# Patient Record
Sex: FEMALE | Race: WHITE | NOT HISPANIC OR LATINO | Employment: OTHER | URBAN - METROPOLITAN AREA
[De-identification: names, ages, dates, MRNs, and addresses within clinical notes are randomized per-mention and may not be internally consistent; named-entity substitution may affect disease eponyms.]

---

## 2017-01-10 ENCOUNTER — OFFICE VISIT - RIVER FALLS (OUTPATIENT)
Dept: FAMILY MEDICINE | Facility: CLINIC | Age: 57
End: 2017-01-10

## 2017-03-27 ENCOUNTER — OFFICE VISIT - RIVER FALLS (OUTPATIENT)
Dept: FAMILY MEDICINE | Facility: CLINIC | Age: 57
End: 2017-03-27

## 2017-03-27 ASSESSMENT — MIFFLIN-ST. JEOR: SCORE: 1122

## 2017-04-28 ENCOUNTER — OFFICE VISIT - RIVER FALLS (OUTPATIENT)
Dept: FAMILY MEDICINE | Facility: CLINIC | Age: 57
End: 2017-04-28

## 2017-04-28 ASSESSMENT — MIFFLIN-ST. JEOR: SCORE: 1126.53

## 2017-07-21 ENCOUNTER — OFFICE VISIT - RIVER FALLS (OUTPATIENT)
Dept: FAMILY MEDICINE | Facility: CLINIC | Age: 57
End: 2017-07-21

## 2017-07-21 ASSESSMENT — MIFFLIN-ST. JEOR: SCORE: 1104.76

## 2017-07-25 LAB
CHOLEST SERPL-MCNC: 215 MG/DL (ref 125–200)
CHOLEST/HDLC SERPL: 3.5 {RATIO}
HBA1C MFR BLD: 5.3 %
HDLC SERPL-MCNC: 61 MG/DL
LDLC SERPL CALC-MCNC: 119 MG/DL
NONHDLC SERPL-MCNC: 154 MG/DL
TRIGL SERPL-MCNC: 176 MG/DL

## 2018-01-10 ENCOUNTER — AMBULATORY - RIVER FALLS (OUTPATIENT)
Dept: FAMILY MEDICINE | Facility: CLINIC | Age: 58
End: 2018-01-10

## 2018-01-10 ENCOUNTER — OFFICE VISIT - RIVER FALLS (OUTPATIENT)
Dept: FAMILY MEDICINE | Facility: CLINIC | Age: 58
End: 2018-01-10

## 2018-01-15 ENCOUNTER — OFFICE VISIT - RIVER FALLS (OUTPATIENT)
Dept: FAMILY MEDICINE | Facility: CLINIC | Age: 58
End: 2018-01-15

## 2018-01-15 ASSESSMENT — MIFFLIN-ST. JEOR: SCORE: 1104.76

## 2018-02-14 ENCOUNTER — OFFICE VISIT - RIVER FALLS (OUTPATIENT)
Dept: FAMILY MEDICINE | Facility: CLINIC | Age: 58
End: 2018-02-14

## 2018-03-07 ENCOUNTER — AMBULATORY - RIVER FALLS (OUTPATIENT)
Dept: FAMILY MEDICINE | Facility: CLINIC | Age: 58
End: 2018-03-07

## 2018-03-14 ENCOUNTER — OFFICE VISIT - RIVER FALLS (OUTPATIENT)
Dept: FAMILY MEDICINE | Facility: CLINIC | Age: 58
End: 2018-03-14

## 2018-04-17 ENCOUNTER — AMBULATORY - RIVER FALLS (OUTPATIENT)
Dept: FAMILY MEDICINE | Facility: CLINIC | Age: 58
End: 2018-04-17

## 2018-04-25 ENCOUNTER — OFFICE VISIT - RIVER FALLS (OUTPATIENT)
Dept: FAMILY MEDICINE | Facility: CLINIC | Age: 58
End: 2018-04-25

## 2018-05-30 ENCOUNTER — OFFICE VISIT - RIVER FALLS (OUTPATIENT)
Dept: FAMILY MEDICINE | Facility: CLINIC | Age: 58
End: 2018-05-30

## 2018-05-30 ASSESSMENT — MIFFLIN-ST. JEOR: SCORE: 1087.52

## 2018-06-25 ENCOUNTER — TRANSFERRED RECORDS (OUTPATIENT)
Dept: HEALTH INFORMATION MANAGEMENT | Facility: CLINIC | Age: 58
End: 2018-06-25

## 2018-07-25 ENCOUNTER — OFFICE VISIT - RIVER FALLS (OUTPATIENT)
Dept: FAMILY MEDICINE | Facility: CLINIC | Age: 58
End: 2018-07-25

## 2018-07-25 ASSESSMENT — MIFFLIN-ST. JEOR: SCORE: 1097.5

## 2018-07-26 LAB
CHOLEST SERPL-MCNC: 231 MG/DL
CHOLEST/HDLC SERPL: 3.9 {RATIO}
GLUCOSE BLD-MCNC: 94 MG/DL (ref 65–99)
HDLC SERPL-MCNC: 60 MG/DL
LDLC SERPL CALC-MCNC: 141 MG/DL
NONHDLC SERPL-MCNC: 171 MG/DL
TRIGL SERPL-MCNC: 160 MG/DL

## 2018-08-10 ENCOUNTER — OFFICE VISIT - RIVER FALLS (OUTPATIENT)
Dept: FAMILY MEDICINE | Facility: CLINIC | Age: 58
End: 2018-08-10

## 2019-01-30 ENCOUNTER — OFFICE VISIT - RIVER FALLS (OUTPATIENT)
Dept: FAMILY MEDICINE | Facility: CLINIC | Age: 59
End: 2019-01-30

## 2019-07-30 ENCOUNTER — OFFICE VISIT - RIVER FALLS (OUTPATIENT)
Dept: FAMILY MEDICINE | Facility: CLINIC | Age: 59
End: 2019-07-30

## 2019-07-30 ASSESSMENT — MIFFLIN-ST. JEOR: SCORE: 1103.85

## 2019-07-31 LAB
B BURGDOR IGG+IGM SER QL: <0.9
CHOLEST SERPL-MCNC: 225 MG/DL
CHOLEST/HDLC SERPL: 3.8 {RATIO}
ERYTHROCYTE [SEDIMENTATION RATE] IN BLOOD BY WESTERGREN METHOD: 9 MM/H
HBA1C MFR BLD: 5.6 %
HDLC SERPL-MCNC: 60 MG/DL
LDLC SERPL CALC-MCNC: 134 MG/DL
NONHDLC SERPL-MCNC: 165 MG/DL
TRIGL SERPL-MCNC: 177 MG/DL
TSH SERPL DL<=0.005 MIU/L-ACNC: 3.47 MIU/L (ref 0.4–4.5)

## 2019-08-01 ENCOUNTER — COMMUNICATION - RIVER FALLS (OUTPATIENT)
Dept: FAMILY MEDICINE | Facility: CLINIC | Age: 59
End: 2019-08-01

## 2019-08-02 ENCOUNTER — COMMUNICATION - RIVER FALLS (OUTPATIENT)
Dept: FAMILY MEDICINE | Facility: CLINIC | Age: 59
End: 2019-08-02

## 2019-08-05 ENCOUNTER — COMMUNICATION - RIVER FALLS (OUTPATIENT)
Dept: FAMILY MEDICINE | Facility: CLINIC | Age: 59
End: 2019-08-05

## 2019-08-07 ENCOUNTER — COMMUNICATION - RIVER FALLS (OUTPATIENT)
Dept: FAMILY MEDICINE | Facility: CLINIC | Age: 59
End: 2019-08-07

## 2019-08-21 ENCOUNTER — OFFICE VISIT - RIVER FALLS (OUTPATIENT)
Dept: FAMILY MEDICINE | Facility: CLINIC | Age: 59
End: 2019-08-21

## 2020-05-14 ENCOUNTER — COMMUNICATION - RIVER FALLS (OUTPATIENT)
Dept: FAMILY MEDICINE | Facility: CLINIC | Age: 60
End: 2020-05-14

## 2020-07-24 ENCOUNTER — AMBULATORY - RIVER FALLS (OUTPATIENT)
Dept: FAMILY MEDICINE | Facility: CLINIC | Age: 60
End: 2020-07-24

## 2020-07-25 ENCOUNTER — COMMUNICATION - RIVER FALLS (OUTPATIENT)
Dept: FAMILY MEDICINE | Facility: CLINIC | Age: 60
End: 2020-07-25

## 2020-07-25 LAB
CHOLEST SERPL-MCNC: 213 MG/DL
CHOLEST/HDLC SERPL: 3.9 {RATIO}
HDLC SERPL-MCNC: 55 MG/DL
LDLC SERPL CALC-MCNC: 130 MG/DL
NONHDLC SERPL-MCNC: 158 MG/DL
TRIGL SERPL-MCNC: 168 MG/DL
TSH SERPL DL<=0.005 MIU/L-ACNC: 3.85 MIU/L (ref 0.4–4.5)

## 2020-09-08 ENCOUNTER — OFFICE VISIT - RIVER FALLS (OUTPATIENT)
Dept: FAMILY MEDICINE | Facility: CLINIC | Age: 60
End: 2020-09-08

## 2020-11-19 ENCOUNTER — COMMUNICATION - RIVER FALLS (OUTPATIENT)
Dept: FAMILY MEDICINE | Facility: CLINIC | Age: 60
End: 2020-11-19

## 2020-11-19 ENCOUNTER — AMBULATORY - RIVER FALLS (OUTPATIENT)
Dept: FAMILY MEDICINE | Facility: CLINIC | Age: 60
End: 2020-11-19

## 2020-11-19 ENCOUNTER — OFFICE VISIT - RIVER FALLS (OUTPATIENT)
Dept: FAMILY MEDICINE | Facility: CLINIC | Age: 60
End: 2020-11-19

## 2021-03-24 ENCOUNTER — AMBULATORY - RIVER FALLS (OUTPATIENT)
Dept: FAMILY MEDICINE | Facility: CLINIC | Age: 61
End: 2021-03-24

## 2021-04-21 ENCOUNTER — AMBULATORY - RIVER FALLS (OUTPATIENT)
Dept: FAMILY MEDICINE | Facility: CLINIC | Age: 61
End: 2021-04-21

## 2021-04-21 ENCOUNTER — COMMUNICATION - RIVER FALLS (OUTPATIENT)
Dept: FAMILY MEDICINE | Facility: CLINIC | Age: 61
End: 2021-04-21

## 2021-05-06 ENCOUNTER — TRANSFERRED RECORDS (OUTPATIENT)
Dept: HEALTH INFORMATION MANAGEMENT | Facility: CLINIC | Age: 61
End: 2021-05-06

## 2021-07-14 ENCOUNTER — COMMUNICATION - RIVER FALLS (OUTPATIENT)
Dept: FAMILY MEDICINE | Facility: CLINIC | Age: 61
End: 2021-07-14

## 2021-07-14 ENCOUNTER — OFFICE VISIT - RIVER FALLS (OUTPATIENT)
Dept: FAMILY MEDICINE | Facility: CLINIC | Age: 61
End: 2021-07-14

## 2021-07-14 ASSESSMENT — MIFFLIN-ST. JEOR: SCORE: 1114.4

## 2021-07-15 ENCOUNTER — COMMUNICATION - RIVER FALLS (OUTPATIENT)
Dept: FAMILY MEDICINE | Facility: CLINIC | Age: 61
End: 2021-07-15

## 2021-07-15 LAB
CHOLEST SERPL-MCNC: 251 MG/DL
CHOLEST/HDLC SERPL: 4.3 {RATIO}
GLUCOSE BLD-MCNC: 95 MG/DL (ref 65–99)
HDLC SERPL-MCNC: 59 MG/DL
LDLC SERPL CALC-MCNC: 157 MG/DL
NONHDLC SERPL-MCNC: 192 MG/DL
TRIGL SERPL-MCNC: 193 MG/DL
TSH SERPL DL<=0.005 MIU/L-ACNC: 3.45 MIU/L (ref 0.4–4.5)

## 2021-07-16 ENCOUNTER — TRANSFERRED RECORDS (OUTPATIENT)
Dept: HEALTH INFORMATION MANAGEMENT | Facility: CLINIC | Age: 61
End: 2021-07-16

## 2021-07-16 LAB — HPV ABSTRACT: NORMAL

## 2021-08-03 ENCOUNTER — COMMUNICATION - RIVER FALLS (OUTPATIENT)
Dept: FAMILY MEDICINE | Facility: CLINIC | Age: 61
End: 2021-08-03

## 2021-08-04 ENCOUNTER — COMMUNICATION - RIVER FALLS (OUTPATIENT)
Dept: FAMILY MEDICINE | Facility: CLINIC | Age: 61
End: 2021-08-04

## 2021-08-05 ENCOUNTER — COMMUNICATION - RIVER FALLS (OUTPATIENT)
Dept: FAMILY MEDICINE | Facility: CLINIC | Age: 61
End: 2021-08-05

## 2021-11-22 ENCOUNTER — AMBULATORY - RIVER FALLS (OUTPATIENT)
Dept: FAMILY MEDICINE | Facility: CLINIC | Age: 61
End: 2021-11-22

## 2022-01-11 ENCOUNTER — COMMUNICATION - RIVER FALLS (OUTPATIENT)
Dept: FAMILY MEDICINE | Facility: CLINIC | Age: 62
End: 2022-01-11

## 2022-01-31 ENCOUNTER — TRANSFERRED RECORDS (OUTPATIENT)
Dept: HEALTH INFORMATION MANAGEMENT | Facility: CLINIC | Age: 62
End: 2022-01-31

## 2022-02-11 VITALS
WEIGHT: 139.8 LBS | DIASTOLIC BLOOD PRESSURE: 62 MMHG | HEIGHT: 59 IN | HEART RATE: 64 BPM | BODY MASS INDEX: 28.18 KG/M2 | SYSTOLIC BLOOD PRESSURE: 110 MMHG

## 2022-02-11 VITALS
SYSTOLIC BLOOD PRESSURE: 112 MMHG | WEIGHT: 139.8 LBS | HEIGHT: 59 IN | TEMPERATURE: 97.3 F | HEART RATE: 60 BPM | BODY MASS INDEX: 28.18 KG/M2 | DIASTOLIC BLOOD PRESSURE: 80 MMHG

## 2022-02-11 VITALS
WEIGHT: 143.6 LBS | HEART RATE: 62 BPM | TEMPERATURE: 98.1 F | HEIGHT: 59 IN | TEMPERATURE: 98.1 F | BODY MASS INDEX: 29.15 KG/M2 | SYSTOLIC BLOOD PRESSURE: 119 MMHG | HEIGHT: 59 IN | BODY MASS INDEX: 28.95 KG/M2 | DIASTOLIC BLOOD PRESSURE: 70 MMHG | HEART RATE: 80 BPM | DIASTOLIC BLOOD PRESSURE: 81 MMHG | WEIGHT: 144.6 LBS | SYSTOLIC BLOOD PRESSURE: 126 MMHG

## 2022-02-11 VITALS
WEIGHT: 139.6 LBS | BODY MASS INDEX: 28.14 KG/M2 | HEART RATE: 64 BPM | SYSTOLIC BLOOD PRESSURE: 116 MMHG | HEIGHT: 59 IN | TEMPERATURE: 97.6 F | DIASTOLIC BLOOD PRESSURE: 78 MMHG

## 2022-02-11 VITALS
DIASTOLIC BLOOD PRESSURE: 76 MMHG | HEART RATE: 71 BPM | SYSTOLIC BLOOD PRESSURE: 112 MMHG | HEIGHT: 59 IN | WEIGHT: 136 LBS | TEMPERATURE: 98.2 F | BODY MASS INDEX: 27.42 KG/M2

## 2022-02-11 VITALS
HEART RATE: 68 BPM | DIASTOLIC BLOOD PRESSURE: 74 MMHG | HEIGHT: 59 IN | BODY MASS INDEX: 27.86 KG/M2 | TEMPERATURE: 98 F | SYSTOLIC BLOOD PRESSURE: 112 MMHG | WEIGHT: 138.2 LBS

## 2022-02-12 VITALS
DIASTOLIC BLOOD PRESSURE: 78 MMHG | OXYGEN SATURATION: 95 % | WEIGHT: 142.8 LBS | HEART RATE: 81 BPM | BODY MASS INDEX: 28.79 KG/M2 | TEMPERATURE: 97.6 F | SYSTOLIC BLOOD PRESSURE: 116 MMHG | HEIGHT: 59 IN

## 2022-02-16 NOTE — TELEPHONE ENCOUNTER
---------------------  From: Catherine Aguilar LPN   To: Referral Coordinators Pool ([id number]_[facility]);     Sent: 7/14/2021 9:14:54 AM CDT  Subject: Imaging Order       Orders have been placed for patient to have a DEXA scan and mammogram. She doesn't want to scheduled until 09/2021.   Normal rate, regular rhythm.  Heart sounds S1, S2.  No murmurs, rubs or gallops.

## 2022-02-16 NOTE — NURSING NOTE
Comprehensive Intake Entered On:  11/19/2020 12:31 PM CST    Performed On:  11/19/2020 12:23 PM CST by Catherine Aguilar LPN               Summary   Chief Complaint :   would like to be tested for COVID, lower back pain, joints hurt, some chest discomfort, two co-workers tested positive and three are pending results   Advance Directive :   No   Languages :   English   Catherine Aguilar LPN - 11/19/2020 12:23 PM CST   Health Status   Allergies Verified? :   Yes   Medication History Verified? :   Yes   Catherine Aguilar LPN - 11/19/2020 12:23 PM CST   Meds / Allergies   (As Of: 11/19/2020 12:31:12 PM CST)   Allergies (Active)   No Known Medication Allergies  Estimated Onset Date:   Unspecified ; Created By:   Penny Lutz CMA; Reaction Status:   Active ; Category:   Drug ; Substance:   No Known Medication Allergies ; Type:   Allergy ; Updated By:   Penny Lutz CMA; Reviewed Date:   7/30/2019 8:56 AM CDT        Medication List   (As Of: 11/19/2020 12:31:12 PM CST)   Prescription/Discharge Order    levothyroxine  :   levothyroxine ; Status:   Prescribed ; Ordered As Mnemonic:   Synthroid 75 mcg (0.075 mg) oral tablet ; Simple Display Line:   1 tab(s), Oral, qam, 90 tab(s), 3 Refill(s) ; Ordering Provider:   Emilie Lassiter; Catalog Code:   levothyroxine ; Order Dt/Tm:   7/25/2020 4:07:11 PM CDT          triamcinolone topical  :   triamcinolone topical ; Status:   Prescribed ; Ordered As Mnemonic:   triamcinolone 0.1% topical cream ; Simple Display Line:   1 gabino, TOP, TID, to left palm--do not fill till she calls, 30 g, 0 Refill(s) ; Ordering Provider:   Emilie Lassiter; Catalog Code:   triamcinolone topical ; Order Dt/Tm:   7/30/2019 9:06:51 AM CDT          amoxicillin  :   amoxicillin ; Status:   Prescribed ; Ordered As Mnemonic:   amoxicillin 500 mg oral tablet ; Simple Display Line:   See Instructions, 4 tabs one hour before dental work, 4 EA, 1 Refill(s) ; Ordering Provider:   Emilie Lassiter;  Catalog Code:   amoxicillin ; Order Dt/Tm:   5/14/2020 12:22:51 PM CDT          ALPRAZolam  :   ALPRAZolam ; Status:   Prescribed ; Ordered As Mnemonic:   Xanax 1 mg oral tablet ; Simple Display Line:   See Instructions, take 1 tab 60 min before dental work , must have  for dentist appointment, PRN: for anxiety, 2 EA, 0 Refill(s) ; Ordering Provider:   Emilie Lassiter; Catalog Code:   ALPRAZolam ; Order Dt/Tm:   5/14/2020 1:45:43 PM CDT            ID Risk Screen   Recent Travel History :   No recent travel   Family Member Travel History :   No recent travel   Other Exposure to Infectious Disease :   Community exposure to COVID-19 within the last 14 days   Catherine Aguilar LPN - 11/19/2020 12:23 PM CST   Social History   Social History   (As Of: 11/19/2020 12:31:12 PM CST)   Alcohol:        Wine (5 oz), 3-5 times per week, 1 drinks/episode average.  Ready to change: No.   (Last Updated: 7/31/2019 6:35:45 PM CDT by Vanessa Salinas)          Tobacco:  Denies Tobacco Use      Never (less than 100 in lifetime)   (Last Updated: 11/19/2020 12:29:23 PM CST by Catherine Aguilar LPN)          Electronic Cigarette/Vaping:  Denies Electronic Cigarette Use      Electronic Cigarette Use: Never.   (Last Updated: 11/19/2020 12:29:29 PM CST by Catherine Aguilar LPN)          Substance Abuse:        Never   (Last Updated: 7/24/2017 8:43:47 AM CDT by Cristiane Donohue)          Employment/School:        Employed, Work/School description: .  Highest education level: University degree(s).   (Last Updated: 7/31/2019 6:36:04 PM CDT by Vanessa Salinas)          Home/Environment:        Marital status: .  Spouse/Partner name: Humberto.  Lives with Spouse.  3 children.  Injuries/Abuse/Neglect in household: No.  Feels unsafe at home: No.  Family/Friends available for support: Yes.   (Last Updated: 7/31/2019 6:36:33 PM CDT by Vanessa Salinas)          Nutrition/Health:        Type of diet: Regular.  Wants to lose weight: Yes.   Sleeping concerns: No.  Feels highly stressed: No.   (Last Updated: 7/31/2019 6:36:49 PM CDT by Vanessa Salinas)          Exercise:        Exercise frequency: 5-6 times/week.  Exercise type: Bicycling, eliptical, rowing machine.   (Last Updated: 7/31/2019 6:37:09 PM CDT by Vanessa Salinas)          Sexual:        Sexually active: Yes.  Sexual orientation: Heterosexual.   (Last Updated: 5/9/2014 2:51:15 PM CDT by Cristiane Liu MA)

## 2022-02-16 NOTE — TELEPHONE ENCOUNTER
Entered by Flori Chacko CMA on April 21, 2021 8:58:04 AM CDT  ---------------------  From: Flori Chacko CMA   To: "[facility]    Sent: 4/21/2021 8:58:00 AM CDT  Subject: Medication Management     ** Not Approved: Patient has requested refill too soon, #90, 3 sent 7/25/20 **  levothyroxine (LEVOTHYROXINE SODIUM 75MCG TABLET)  TAKE ONE TABLET BY MOUTH EVERY MORNING AS DIRECTED    DELIVER ALL ON MON  Qty:  90 EA        Days Supply:  90        Refills:  3          Substitutions Allowed     Route To Pharmacy - "[facility]   Signed by Flori Chacko CMA            ** Patient matched by Flori Chacko CMA on 4/21/2021 8:54:12 AM CDT **      ------------------------------------------  From: [employer]  To: Emilie Lassiter  Sent: April 21, 2021 8:32:40 AM CDT  Subject: Medication Management  Due: April 7, 2021 1:46:04 PM CDT     ** On Hold Pending Signature **     Drug: levothyroxine (levothyroxine 75 mcg (0.075 mg) oral tablet), TAKE ONE TABLET BY MOUTH EVERY MORNING AS DIRECTED DELIVER ALL ON MON  Quantity: 90 EA  Days Supply: 90  Refills: 3  Substitutions Allowed  Notes from Pharmacy:     Dispensed Drug: levothyroxine (levothyroxine 75 mcg (0.075 mg) oral tablet), TAKE ONE TABLET BY MOUTH EVERY MORNING AS DIRECTED DELIVER ALL ON MON  Quantity: 90 EA  Days Supply: 90  Refills: 3  Substitutions Allowed  Notes from Pharmacy:  ------------------------------------------

## 2022-02-16 NOTE — TELEPHONE ENCOUNTER
---------------------  From: Winter Pino LPN (Phone Messages Pool ([id number]))   To: [facility] Message Pool ([facility]     Sent: 1/8/2021 1:01:44 PM CST  Subject: triamcinolone refill     Phone Message    PCP:   IVAN      Time of Call:  12:27pm       Person Calling:  pt  Phone number:  [phone]    Returned call at:    Note:   Pt LM stating she needs a refill of the 1% cream [facility] had prescribed her for her hand. Pt would like this sent to "[facility].    Pt has Rx on med list for triamcinolone 0.1% topical cream 1 gabino TOP BID to left palm #30gm- last prescribed 7/30/19    Pt says Rx has to be generic or insurance will not cover.    Last office visit and reason:  11/19/20 video visit---------------------  From: Catherine Aguilar LPN ([facility] Message Pool ([facility]   To: Emilie Lassiter;     Sent: 1/8/2021 1:19:38 PM CST  Subject: FW: triamcinolone refill---------------------  From: Emilie Lassiter   To: [facility] Message Pool ([facility]);     Sent: 1/8/2021 2:13:07 PM CST  Subject: RE: triamcinolone refill     rx sent  all steroid creams are expensive, this is [email or web address] A male answered at provided number, asked that he have her contact me at the clinic when she is available.Pt LM at 3:23pm. Pt says OK to LM at [phone].    Returned call and LM letting pt know generic Rx sent and that all steroid creams are expensive.

## 2022-02-16 NOTE — TELEPHONE ENCOUNTER
---------------------  From: Emilie Lassiter   To: DUSTIN EDDY    Sent: 10/16/2021 3:54:21 PM CDT  Subject: General Message     Hi Dustin Ayala,    Your bone density results indicate:  _   Normal bone density  X_   Osteopenia in hip/spine (mild bone thinning   not osteoporosis)  _   Osteoporosis in hip/spine  Our recommendation is:  _   Schedule an appointment with your health care provider to discuss your results.     X_   Continue current regimen    _   Calcium    Recommended daily amounts for men and women are:  o Men age 50 - 69  1000 mg  o Men age 70+  1200 mg  o Women age 50+  1200 mg  Vitamin D    800 - 1000 IU daily  Weight bearing Exercise    30 minutes or more several times a week  Avoid excess alcohol and smoking  Prevent falling    Avoid excessive sedation or hypotension (low blood pressure)    Improve strength    Decrease or remove environmental hazards    _   Repeat bone density in 2-3_ year(s)    Things are stable at this point, thank you.    DILCIA Blake

## 2022-02-16 NOTE — TELEPHONE ENCOUNTER
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  Both pt and her  would qualify for COVID vaccine.  Please call to schedulepatient is scheduled

## 2022-02-16 NOTE — NURSING NOTE
Faxed biometric screening form to [employer] at [phone]. Confirmation received. Original form mailed to patient's home address.

## 2022-02-16 NOTE — TELEPHONE ENCOUNTER
Entered by Cecilia Buck CMA on July 28, 2020 3:52:20 PM CDT  ---------------------  From: Cecilia Buck CMA   To: [employer]    Sent: 7/28/2020 3:52:20 PM CDT  Subject: Medication Management     ** Not Approved: Patient has requested refill too soon, a refill was sent 7/25/20 for 90 tabs with 3 refills **  levothyroxine (LEVOTHYROXINE 75 MCG 75MCG TABLET)  TAKE 1 TABLET BY MOUTH EVERY MORNING AS DIRECTED    DEL ALL ON MON  Qty:  90 unknown unit        Days Supply:  0        Refills:  0          Substitutions Allowed     Route To Pharmacy - [facility]   Signed by Cecilia Buck CMA            ** Patient matched by Cecilia Buck CMA on 7/28/2020 3:51:00 PM CDT **      ------------------------------------------  From: Squid Facil  To: Emilie Lassiter  Sent: July 27, 2020 9:38:57 AM CDT  Subject: Medication Management  Due: July 22, 2020 2:32:08 PM CDT     ** On Hold Pending Signature **     Dispensed Drug: levothyroxine (levothyroxine 75 mcg (0.075 mg) oral tablet), TAKE 1 TABLET BY MOUTH EVERY MORNING AS DIRECTED DEL ALL ON MON  Quantity: 90 unknown unit  Days Supply: 0  Refills: 0  Substitutions Allowed  Notes from Pharmacy:  ------------------------------------------

## 2022-02-16 NOTE — TELEPHONE ENCOUNTER
---------------------  From: Kenzie/Katie TORRES (Phone Messages Pool ([facility]_[place] [facility]))   To: Referral Coordinators Pool ([facility]);     Sent: 8/5/2021 12:41:53 PM CDT  Subject: FW: Scheduling bone density testing and mammogram     Can you re fax the orders that were sent on 7/14/21 to [facility]?        ---------------------  From: DUSTIN EDDY  To: [facility]  Sent: 08/05/2021 12:35 p.m. CDT  Subject: Scheduling bone density testing and mammogram  Hello  I was on the phone for 20 minutes over my lunch hour (long distance) trying to schedule these appointments with the hospital. They never contacted me to schedule anything.    Are they not available over the lunch hour, or do they have a 1-800 number I could use?    I couldn t wait any longer over the lunch hour.  Thank you.---------------------  From: Baylee Bryson (Referral Coordinators Pool ([facility] [place]))   To: [employer] ([facility] [facility] [place]);     Sent: 8/5/2021 12:45:53 PM CDT  Subject: RE: Scheduling bone density testing and mammogram     Yes. The direct number to radiology scheduling is [phone] if patient needs to call on her lunchbreak.---------------------  From: Katie Beth (Phone Messages Pool (32224_[place] [employer]))   To: DUSTIN EDDY    Sent: 8/5/2021 12:48:03 PM CDT  Subject: FW: Scheduling bone density testing and mammogram     [facility],    I requested that these orders be re faxed to the Hospital. The direct radiology scheduling number is [phone].    Katie CORREA CMA

## 2022-02-16 NOTE — PROGRESS NOTES
Chief Complaint  bilateral ear wax impaction  History of Present Illness  Noticed her ears felt plugged today and decreased hearing today. ?Her hearing was fine yesterday. ?She has never had cerumen impaction before. Had to leave work as unable to hear.  Review of Systems  She denies any fevers or drainage from the ears. ?She denies any pain in the ears.  Problem List/Past Medical History  Ongoing  ASCUS on PAP Smear  Hypothyroidism  Knee pain  Osteopenia  Rectal mass  Resolved  Pregnancy  Pregnancy  Pregnancy  Procedure/Surgical History  Flexible sigmoidoscopy (06/27/2016),  Colonoscopy (06/22/2015),  Colonoscopy (06/02/2014),  Colonoscopy [facility] (10/22/2002),  DEXA needs repeat 2018,  Vaginal delivery X 3.  Home Medications  Synthroid 75 mcg (0.075 mg) oral tablet, 75 mcg, 1 tab(s), po, daily, 3 refills  Allergies  No known allergies  Social History  Alcohol  4 times per week, 1 glass of wine with dinner.  Employment and Education  Employed, Work/School description: .  Nutrition and Health  Type of diet: Regular.  Sexual  Sexually active: Yes. Sexual orientation: Heterosexual.  Tobacco - Denies Tobacco Use  Never  Immunizations  Physical Exam  Vitals & Measurements  T:?98.1?(Tympanic)?  HR:?80?(Peripheral)?  BP:?119/81?  HT:?59?in?  WT:?143.6?lb?  BMI:?29?  General: No acute distress  Ears: Bilateral cerumen impaction  Lab Results  Diagnostic Results  Cerumen removed by nurse with irrigation. ?Repeat exam showed normal tympanic membranes.  Assessment/Plan  Decreased hearing  Cerumen impaction with removal and hearing back to normal. ?Discussed Debrox drops in the future if she starts noticing a problem

## 2022-02-16 NOTE — NURSING NOTE
Hearing and Vision Screening Entered On:  7/14/2021 9:11 AM CDT    Performed On:  7/14/2021 9:11 AM CDT by Catherine Aguilar LPN               Hearing and Vision Screening   Audiogram Result Right Ear :   Pass   Audiogram Result Left Ear :   Pass   Catherine Aguilar LPN - 7/14/2021 9:11 AM CDT

## 2022-02-16 NOTE — PROGRESS NOTES
Patient:   DUSTIN EDDY            MRN: [id number]            FIN: [id number]               Age:   57 years     Sex:  Female     :  1960   Associated Diagnoses:   Well adult; Hypothyroidism; Lipid screening   Author:   Emilie Lassiter      Visit Information      Date of Service: 2017 08:54 am  Performing Location: [facility]  Encounter#: [id number]      Primary Care Provider (PCP):  [id number] -UNKNOWN,      Referring Provider:  Emilie Lassiter    NPI# [id number]   Visit type:  Annual exam.    Accompanied by:  No one.    Source of history:  Self.    Referral source:  Self.    History limitation:  None.       Chief Complaint   2017 9:04 AM CDT    Patient is here for annual physical and synthroid refill.        Well Adult History   Well Adult History             The patient presents for well adult exam,   doing well  NeedsTSH and wants screening lipids and diabetes for work requirement. SHe forgot to bring in the sheet I need to sign but kris bring in next week  Colon cancer screen due , hx polps.  DUe for mammo, order written  Dexa due 2018  Pap not due, no periods.  The general health status is good.  The patient's diet is described as balanced.  Exercise: none.  Associated symptoms consist of none.  Medical encounters:.        Review of Systems   Constitutional:  Negative except as documented in history of present illness.    Eye:  Negative except as documented in history of present illness.    Respiratory:  Negative except as documented in history of present illness.    Cardiovascular:  Negative except as documented in history of present illness.    Breast:  Negative.    Gastrointestinal:  Negative except as documented in history of present illness.    Genitourinary:  Negative except as documented in history of present illness.    Gynecologic:  Negative except as documented in history of present illness.    Hematology/Lymphatics:  Negative except as documented in history of  present illness.    Endocrine:  Negative except as documented in history of present illness.    Immunologic:  Negative except as documented in history of present illness.    Musculoskeletal:  Negative except as documented in history of present illness.    Integumentary:  Negative except as documented in history of present illness.    Neurologic:  Negative except as documented in history of present illness.    Psychiatric:  Negative except as documented in history of present illness.              Health Status   Allergies:    Allergic Reactions (Selected)  No Known Medication Allergies   Medications:  (Selected)   Prescriptions  Prescribed  Synthroid 75 mcg (0.075 mg) oral tablet: 1 tab(s) ( 75 mcg ), PO, Daily, # 90 tab(s), 3 Refill(s), JESÚS, Type: Maintenance, Pharmacy: [facility], 1 tab(s) po daily  triamcinolone 0.1% topical cream: 1 gabino, TOP, TID, Instructions: to left palm, # 30 g, 0 Refill(s), Type: Maintenance, Pharmacy: [facility], 1 gabino top tid,Instr:to left palm   Problem list:    All Problems  Hypothyroidism / SNOMED CT 517388421 / Confirmed  Rectal mass / SNOMED CT 713209914 / Confirmed  Osteopenia / SNOMED CT 198161395 / Confirmed  Knee pain / SNOMED CT 73452364 / Confirmed  ASCUS on PAP Smear / ICD-9-.01 / Confirmed  Resolved: Pregnancy / SNOMED CT 086371709  Resolved: Pregnancy / SNOMED CT 803385201  Resolved: Pregnancy / SNOMED CT 228160485      Histories   Past Medical History:    Active  ASCUS on PAP Smear (795.01): Onset on 4/19/2010 at 50 years.  Hypothyroidism (255463391)  Knee pain (34700126)  Osteopenia (804925952)  Resolved  Pregnancy (880792521):  Resolved in the month of 6/1985 at 25 years.  Pregnancy (708551707):  Resolved in the month of 6/1987 at 27 years.  Pregnancy (873039036):  Resolved in the month of 7/1992 at 32 years.   Family History:    Grandmother (P)  Breast cancer  Aunt (M)  Breast cancer  Mother  COPD  Allergy  Breast cancer  Osteoporosis  High blood  "pressure  Father:  ()  Age at death unknown.   CAD - Coronary artery disease  COPD     Procedure history:    Flexible sigmoidoscopy (SNOMED CT 04838741) on 2016 at 56 Years.  Comments:  2016 6:45 PM - Darlene Berger MA  [facility]  Findings:   no polyps seen  Recommendation:   f/u flex sig in 1yr; f/u colonoscopy due 2018  Colonoscopy (ICD-9-CM 45.23) performed by Abigail Brooks MD on 2015 at 55 Years.  Comments:  2016 12:54 PM - Nevin Matute  Indication: Previous adenomatous polyps.  Sedation: Fentanyl 0.1 and Midazolam 2 mg IV.  Recommendation: Repeat in 3 years.   Flexible sigmoidoscopy in 1  year to inspect transanal excision site.  Colonoscopy (SNOMED CT 845065299) performed by Hang Calderón MD on 2014 at 54 Years.  Comments:  2014 3:44 PM - Alyson Roque RN  Sedation: midazolam, fentanyl  Indication: screening  30mm Tubulovillous adenoma, consistent with \"advance adenoma\", no high grade dysplasia  Referred [facility]  Colonoscopy [facility] on 10/22/2002 at 42 Years.  Comments:  2013 11:19 AM - Stephanie Lopez MA  normal  DEXA needs repeat .  Vaginal delivery X 3.      Physical Examination   Vital Signs   2017 9:04 AM CDT Temperature Tympanic 97.3 DegF  LOW    Peripheral Pulse Rate 60 bpm    Pulse Site Radial artery    HR Method Manual    Systolic Blood Pressure 112 mmHg    Diastolic Blood Pressure 80 mmHg    Mean Arterial Pressure 91 mmHg    BP Site Right arm    BP Method Manual      Measurements from flowsheet : Measurements   2017 9:04 AM CDT Height Measured - Standard 59 in    Weight Measured - Standard 139.8 lb    BSA 1.62 m2    Body Mass Index 28.23 kg/m2      General:  Alert and oriented, No acute distress, vital signs stable, as noted above.    Eye:  Pupils are equal, round and reactive to light, Extraocular movements are intact, Normal conjunctiva.    HENT:  Normocephalic, Tympanic membranes " are clear, Normal hearing, Oral mucosa is moist, No pharyngeal erythema.    Neck:  Supple, Non-tender, No lymphadenopathy, No thyromegaly.    Respiratory:  Lungs are clear to auscultation, Respirations are non-labored, Breath sounds are equal, Symmetrical chest wall expansion.    Cardiovascular:  Normal rate, Regular rhythm, No murmur, No edema.    Breast:  No mass, No tenderness, No discharge, Breasts examined .  No infra nor supraclavicular nodes palpable.  No axillary nodes or masses palpable.  No nipple discharge. Breasts normal throughout.    Gastrointestinal:  Soft, Non-tender, Non-distended, No organomegaly.    Musculoskeletal:  Normal range of motion, Normal strength, No deformity, Normal gait.    Integumentary:  Warm, Dry, Pink, Intact, No rash.    Neurologic:  Alert, Oriented, Normal sensory, Normal motor function, Cranial Nerves II-XII are grossly intact.    Psychiatric:  Cooperative, Appropriate mood & affect, Normal judgment, PHQ 9/CAGE questionaire reviewed and discussed with patient,  see score.       Review / Management   Results review      Impression and Plan   Diagnosis     Well adult (CYL08-UP Z00.00).     Hypothyroidism (VHR73-OW E06.3).     Lipid screening (UWB54-WO Z13.220).     Patient Instructions:       Counseled: Patient, Regarding diagnosis, Regarding medications, Verbalized understanding, counseled on health benefits of healthy weight, regular exercise, healthy diet.    Orders     Orders (Selected)   Outpatient Orders  Ordered (In Transit)  Hemoglobin A1c* ([facility]): Specimen Type: Blood, Collection Date: 07/21/17 9:29:00 CDT  Lipid panel with reflex to direct ldl* ([facility]): Specimen Type: Serum, Collection Date: 07/21/17 9:30:00 CDT  TSH* ([facility]): Specimen Type: Serum, Collection Date: 07/21/17 9:30:00 CDT.

## 2022-02-16 NOTE — PROGRESS NOTES
Patient:   DUSTIN EDDY            MRN: [id number]            FIN: [id number]               Age:   61 years     Sex:  Female     :  1960   Associated Diagnoses:   Well adult; Family history of colonic polyps; History of colon polyps; Hypothyroidism; Need for lipid screening; Screening for cervical cancer; Screening for diabetes mellitus (DM)   Author:   Emilie Lassiter      Visit Information      Date of Service: 2021 07:32 am  Performing Location: [facility]  Encounter#: [id number]      Primary Care Provider (PCP):  ABRAHAM -UNKNOWN,    NPI# [id number]      Referring Provider:  Emilie Lassiter    NPI# [id number]      Chief Complaint   2021 7:40 AM CDT    annual exam, needing forms completed for health insurance company, due  to have TSH level checked      Well Adult History   Well Adult History             The patient presents for well adult exam, , 3 kids are grown  bone density--declining, mom with severe osteoporosis and Dustin Ayala would like to prevent that  mammo and dexa ordered.  State she follows with [facility] associates and had screening with polyps for colon cancer in  and she is due in  for next colonoscopy.  Due for pap, ASCUS years ago, normal no HPV in . Post menopausal  Needs TSH checked  borderline lipids in past, will check today and glucose, she is fasting.  The general health status is good.  The patient's diet is described as balanced.  Exercise: none.  Associated symptoms consist of none.  Medical encounters:.        Review of Systems   Constitutional:  Negative except as documented in history of present illness.    Eye:  Negative except as documented in history of present illness.    Respiratory:  Negative except as documented in history of present illness.    Cardiovascular:  Negative except as documented in history of present illness.    Breast:  Negative.    Gastrointestinal:  Negative except as documented in history of present  illness.    Genitourinary:  Negative except as documented in history of present illness.    Gynecologic:  Negative except as documented in history of present illness.    Hematology/Lymphatics:  Negative except as documented in history of present illness.    Endocrine:  Negative except as documented in history of present illness.    Immunologic:  Negative except as documented in history of present illness.    Musculoskeletal:  Negative except as documented in history of present illness.    Integumentary:  Negative except as documented in history of present illness.    Neurologic:  Negative except as documented in history of present illness.    Psychiatric:  Negative except as documented in history of present illness.              Health Status   Allergies:    Allergic Reactions (Selected)  No Known Medication Allergies   Medications:  (Selected)   Prescriptions  Prescribed  Synthroid 75 mcg (0.075 mg) oral tablet: = 1 tab(s), Oral, qam, # 90 tab(s), 3 Refill(s), Type: Maintenance, Pharmacy: [facility], OK TO DISPENSE GENERIC/WHATEVER IS MORE AFFORDABLE, 1 tab(s) Oral qam, 59, in, 07/30/19 8:46:00 CDT, Height Measured, 139.6, lb, 07/30/19 8:46:00 CDT, Weight M...  Xanax 1 mg oral tablet: See Instructions, Instructions: take 1 tab 60 min before dental work , must have  for dentist appointment, PRN: for anxiety, # 2 EA, 0 Refill(s), Type: Maintenance, Pharmacy: [facility], cancel first rx please, she has 2 procedures coming up,...  triamcinolone 0.1% topical cream: 1 gabino, TOP, TID, Instructions: to left palm--, # 30 g, 0 Refill(s), Type: Maintenance, Pharmacy: [facility], 1 gabino Topical tid,Instr:to left palm--, 59, in, 07/30/19 8:46:00 CDT, Height Measured, 139.6, lb, 07/30/19 8:46:00 CDT, Weight Measured   Problem list:    All Problems  Acute lyme disease / SNOMED CT 9356261018 / Confirmed  Atypical squamous cells of undetermined significance (ASCUS) on Papanicolaou smear of cervix / SNOMED CT 4731875094 /  Confirmed  Hypothyroidism / SNOMED CT 744987269 / Confirmed  Osteoarthritis / SNOMED CT 7074871009 / Confirmed  Osteopenia / SNOMED CT 012651588 / Confirmed  Resolved: Inpatient stay / SNOMED CT 073822356  @[place], [place] - Primary osteoarthritis of right knee  Resolved: Knee pain / SNOMED CT 11726749  Resolved: Pregnancy / SNOMED CT 636742930  Resolved: Pregnancy / SNOMED CT 817549223  Resolved: Pregnancy / SNOMED CT 800862569  Resolved: Rectal mass / SNOMED CT 117142188      Histories   Past Medical History:    Active  Atypical squamous cells of undetermined significance (ASCUS) on Papanicolaou smear of cervix (7869433313): Onset on 2010 at 50 years.  Hypothyroidism (315623511)  Osteopenia (111200808)  Osteoarthritis (7297681215)  Resolved  Inpatient stay (054012606): Onset on 2018 at 57 years.  Resolved on 2018 at 57 years.  Comments:  2018 CST 7:53 AM CST - Gloria Almonte  @[place], [place] - Primary osteoarthritis of right knee  Knee pain (62226254):  Resolved.  Rectal mass (845433577):  Resolved.  Pregnancy (782031724):  Resolved in the month of 1985 at 25 years.  Pregnancy (339468400):  Resolved in the month of 1987 at 27 years.  Pregnancy (522224018):  Resolved in the month of 1992 at 32 years.   Family History:    Brother    History is negative.  Brother    History is negative.  Grandmother (P)  Breast cancer  Aunt (M)  Breast cancer  Mother  COPD  Allergy  Breast cancer  Osteoporosis  High blood pressure  Father:  ()  Age at death unknown.   CAD - Coronary artery disease  COPD     Procedure history:    Mammogram (SNOMED CT 911023092) on 2020 at 60 Years.  Comments:  2020 11:08 AM CDT - Catherine Aguilar LPN  ACR 1 Negative. Recommend annual mammograms.  Colonoscopy (SNOMED CT 592807260) on 2018 at 58 Years.  Comments:  2018 9:27 AM CDT - Mercedes Lux  Repeat 5 years- [facility]  Arthroplasty of knee (SNOMED CT 58639627)  "performed by Lorraine on 1/31/2018 at 57 Years.  Comments:  2/13/2018 7:55 AM CST - Gloria Almonte  Right  Eye surgery (SNOMED CT 4266722426) on 6/13/2017 at 57 Years.  Flexible sigmoidoscopy (SNOMED CT 45784115) on 6/27/2016 at 56 Years.  Comments:  6/28/2016 6:45 PM CDT - Ab TORRES, Darlene  [facility]  Findings:   no polyps seen  Recommendation:   f/u flex sig in 1yr; f/u colonoscopy due June 2018  Colonoscopy (ICD-9-CM 45.23) performed by Abigail Brooks MD on 6/22/2015 at 55 Years.  Comments:  1/20/2016 12:54 PM CST - Nevin Matute  Indication: Previous adenomatous polyps.  Sedation: Fentanyl 0.1 and Midazolam 2 mg IV.  Recommendation: Repeat in 3 years.   Flexible sigmoidoscopy in 1  year to inspect transanal excision site.  Colonoscopy (SNOMED CT 618779971) performed by Hang Calderón MD on 6/2/2014 at 54 Years.  Comments:  6/6/2014 3:44 PM CDT - Alyson Roque RN  Sedation: midazolam, fentanyl  Indication: screening  30mm Tubulovillous adenoma, consistent with \"advance adenoma\", no high grade dysplasia  Referred to colorectal associates  Colonoscopy [place] on 10/22/2002 at 42 Years.  Comments:  5/7/2013 11:19 AM CDT - Stephanie Lopez MA  normal  DEXA needs repeat 2018.   Social History:        Electronic Cigarette/Vaping Assessment: Denies Electronic Cigarette Use            Electronic Cigarette Use: Never.      Alcohol Assessment            Wine (5 oz), 3-5 times per week, 1 drinks/episode average.  Ready to change: No.      Tobacco Assessment: Denies Tobacco Use            Never (less than 100 in lifetime)      Substance Abuse Assessment            Never      Employment and Education Assessment            Employed, Work/School description: .  Highest education level: University degree(s).      Home and Environment Assessment            Marital status: .  Spouse/Partner name: Humberto.  Lives with Spouse.  3 children.  Injuries/Abuse/Neglect               in " household: No.  Feels unsafe at home: No.  Family/Friends available for support: Yes.      Nutrition and Health Assessment            Type of diet: Regular.  Wants to lose weight: Yes.  Sleeping concerns: No.  Feels highly stressed: No.      Exercise and Physical Activity Assessment            Exercise frequency: 5-6 times/week.  Exercise type: Bicycling, eliptical, rowing machine.      Sexual Assessment            Sexually active: Yes.  Sexual orientation: Heterosexual.        Physical Examination   Vital Signs   7/14/2021 7:40 AM CDT Temperature Tympanic 97.6 DegF  LOW    Peripheral Pulse Rate 81 bpm    Systolic Blood Pressure 116 mmHg    Diastolic Blood Pressure 78 mmHg    Mean Arterial Pressure 91 mmHg    BP Site Right arm    BP Method Manual    Oxygen Saturation 95 %      Measurements from flowsheet : Measurements   7/14/2021 7:40 AM CDT Height Measured - Standard 58.75 in    Weight Measured - Standard 142.8 lb    BSA 1.64 m2    Body Mass Index 29.08 kg/m2  HI      General:  Alert and oriented, No acute distress, vital signs stable, as noted above.    Eye:  Pupils are equal, round and reactive to light, Extraocular movements are intact, Normal conjunctiva.    HENT:  Normocephalic, Tympanic membranes are clear, Normal hearing.    Neck:  Supple, Non-tender, No lymphadenopathy, No thyromegaly.    Respiratory:  Lungs are clear to auscultation, Respirations are non-labored, Breath sounds are equal, Symmetrical chest wall expansion.    Cardiovascular:  Normal rate, Regular rhythm, No murmur, No edema.    Breast:  No mass, No tenderness, No discharge, Breasts examined .  No infra nor supraclavicular nodes palpable.  No axillary nodes or masses palpable.  No nipple discharge. Breasts normal throughout.    Gastrointestinal:  Soft, Non-tender, Non-distended, No organomegaly.    Genitourinary:  Normal genitalia for age and sex, No inguinal tenderness, No urethral discharge, No lesions.         Perineum: Within normal  limits.         Groin/ inguinal region: Within normal limits.         Urethra: Within normal limits.         Vagina: Within normal limits.         Labia: Within normal limits.         Cervix: Within normal limits.         Uterus: Within normal limits.         Adnexa: Within normal limits.    Lymphatics:  no axillary, no supra or infraclavicular nor inguinal lymphadenopathy palpable.    Musculoskeletal:  Normal range of motion, Normal strength, No deformity, Normal gait.    Integumentary:  Warm, Dry, Pink, Intact, No rash.    Neurologic:  Alert, Oriented, Normal sensory, Normal motor function, Cranial Nerves II-XII are grossly intact.    Psychiatric:  Cooperative, Appropriate mood & affect, Normal judgment, PHQ 9/CAGE questionaire reviewed and discussed with patient,  see score.       Impression and Plan   Diagnosis     Well adult (PMJ00-FQ Z00.00).     Family history of colonic polyps (KHP16-HV Z83.71).     History of colon polyps (IEV05-ND Z86.010).     Hypothyroidism (VRE29-PE E06.3).     Need for lipid screening (QKK09-GZ Z13.220).     Screening for cervical cancer (DTS10-IT Z12.4).     Screening for diabetes mellitus (DM) (XFP92-SN Z13.1).     Patient Instructions:       Counseled: Patient, Regarding diagnosis, Regarding medications, Verbalized understanding, counseled on health benefits of healthy weight, regular exercise, healthy diet.    Orders     Orders (Selected)   Outpatient Orders  Ordered  Adacel (Tdap): 0.5 mL, im, once  Ordered (Dispatched)  Glucose* ([facility]): Specimen Type: Serum, Collection Date: 07/14/21 8:17:00 CDT  Lipid panel with reflex to direct ldl* ([facility]): Specimen Type: Serum, Collection Date: 07/14/21 8:17:00 CDT  TSH* ([facility]): Specimen Type: Serum, Collection Date: 07/14/21 8:17:00 CDT  ThinPrep Imaging Pap and HPV mRNA E6/E7* ([facility]): Specimen Type: Pap, Collection Date: 07/14/21 8:19:00 CDT  Completed  27011 tdap vaccine 7/> yr im (Charge): Quantity: 1, Encounter for immunization.

## 2022-02-16 NOTE — TELEPHONE ENCOUNTER
---------------------  From: Emilie Lassiter   To: IVAN Message Pool ([id number]_[facility]); DUSTIN EDDY    Sent: 10/16/2021 3:53:05 PM CDT  Subject: General Message     This letter is to inform you that I have received a copy of your mammogram results.  Your results were NORMAL.  You will also receive notice of your results from the radiologist within 7-10 days.  This letter is to let you know I have also received a copy and it is filed in your clinic chart.    Please plan on having your next mammogram done in 12 months.  Thank you.    DILCIA Blake

## 2022-02-16 NOTE — TELEPHONE ENCOUNTER
---------------------  From: Emilie Lassiter   To: DUSTIN EDDY    Sent: 7/15/2021 9:53:21 AM CDT  Subject: General Message     Cholesterol numbers are about where they usually are.  Your total cholesterol is high (should be under 200).  Your LDL (Low density) is also high (should be under 130). It is reasonable to work on lifestyle modifications--total fat consumption daily between 25-35% of diet, saturated fat less than 7%, trans fat intake less than 1%, cholesterol from food limited to less than 300mg/day. Increase fiber intake to 25-35grams/day.     The thyroid level is in the normal range and I sent refills of your medication to your pharmacy.    DILCIA Blake        Results:  Date Result Name Ind Value Ref Range   7/14/2021 8:40 AM Glucose Level  95 mg/dL (65 - 99)   7/14/2021 8:40 AM Cholesterol ((H)) 251 mg/dL ( - <200)   7/14/2021 8:40 AM Non-HDL Cholesterol ((H)) 192 ( - <130)   7/14/2021 8:40 AM HDL  59 mg/dL (> OR = 50 - )   7/14/2021 8:40 AM Cholesterol/HDL Ratio  4.3 ( - <5.0)   7/14/2021 8:40 AM LDL ((H)) 157    7/14/2021 8:40 AM Triglyceride ((H)) 193 mg/dL ( - <150)   7/14/2021 8:40 AM TSH  3.45 mIU/L (0.40 - 4.50)

## 2022-02-16 NOTE — TELEPHONE ENCOUNTER
Entered by Laura Bynum on August 02, 2019 8:41:01 AM CDT  ---------------------  From: Laura Bynum   To: Serafin Hollis    Sent: 8/2/2019 8:41:01 AM CDT  Subject: Medication Management     ** Not Approved: Patient has requested refill too soon **  levothyroxine (LEVOTHYROXINE  75 MCG  TAB TABLET)  TAKE ONE TABLET BY MOUTH EVERY MORNING AS DIRECTED  Qty:  90 unknown unit        Days Supply:  0        Refills:  0          Substitutions Allowed     Route To Pharmacy - [facility]   Signed by Laura Bynum            ** Patient matched by Laura Bynum on 8/2/2019 8:39:43 AM CDT **      ------------------------------------------  From: [facility]  To: Emilie Lassiter  Sent: August 1, 2019 8:29:58 AM CDT  Subject: Medication Management  Due: August 2, 2019 8:29:58 AM CDT    ** On Hold Pending Signature **  Drug: levothyroxine (Synthroid 75 mcg (0.075 mg) oral tablet)  TAKE ONE TABLET BY MOUTH EVERY MORNING AS DIRECTED  Quantity: 90 unknown unit  Days Supply: 0         Refills: 0  Substitutions Allowed  Notes from Pharmacy:     Dispensed Drug: levothyroxine (levothyroxine 75 mcg (0.075 mg) oral tablet)  TAKE ONE TABLET BY MOUTH EVERY MORNING AS DIRECTED  Quantity: 90 unknown unit  Days Supply: 0         Refills: 0  Substitutions Allowed  Notes from Pharmacy:   ------------------------------------------

## 2022-02-16 NOTE — TELEPHONE ENCOUNTER
---------------------  From: Ralph BREWSTER, Leena CEJA (Phone Messages Pool ([id number]_[facility]))   To: IVAN Message Pool (32224_[facility]);     Sent: 11/25/2020 11:31:40 AM CST  Subject: COVID result     Phone message    PCP:   IVAN per message      Time of Call:  1109       Person Calling:  Pt  Phone number:  [phone], Please LVM    Returned call at: 1130    Note:   Pt LVM hoping for COVID result. None in chart yet.    Called and notified pt we will call her with result when available.    Last office visit and reason:  11/19/20, exposure/fatigue/muscle achesCalled and left message for patient(per her request on identified voicemail [phone]) with negative covid results. Advised her to call us back if still not feeling better.

## 2022-02-16 NOTE — TELEPHONE ENCOUNTER
---------------------  From: Emilie Lassiter   To: DUSTIN EDDY    Sent: 8/1/2019 4:07:42 PM CDT  Subject: Patient Message - Results Notification        Here are the lab results, Dustin Ayala. The cholesterol numbers are a bit improved from last year. I sent refills on your thyroid medication, same dose. The inflammatory test is negative as is the Lyme test.    DILCIA Blake    Results:  Date Result Name Ind Value Ref Range   7/30/2019 9:35 AM Hgb A1c  5.6 ( - <5.7)   7/30/2019 9:35 AM Cholesterol ((H)) 225 mg/dL ( - <200)   7/30/2019 9:35 AM Non-HDL Cholesterol ((H)) 165 ( - <130)   7/30/2019 9:35 AM HDL  60 mg/dL (>50 - )   7/30/2019 9:35 AM Cholesterol/HDL Ratio  3.8 ( - <5.0)   7/30/2019 9:35 AM LDL ((H)) 134    7/30/2019 9:35 AM Triglyceride ((H)) 177 mg/dL ( - <150)   7/30/2019 9:35 AM TSH  3.47 mIU/L (0.40 - 4.50)   7/30/2019 9:35 AM Sed Rate  9 ( - < OR = 30)   7/30/2019 9:35 AM Lyme Ab IgG/IgM WB  <0.90

## 2022-02-16 NOTE — NURSING NOTE
Depression Screening Entered On:  7/31/2019 6:38 PM CDT    Performed On:  7/30/2019 6:37 PM CDT by Vanessa Salinas               Depression Screening   Little Interest - Pleasure in Activities :   Not at all   Feeling Down, Depressed, Hopeless :   Not at all   Initial Depression Screen Score :   0    Trouble Falling or Staying Asleep :   Not at all   Feeling Tired or Little Energy :   Not at all   Poor Appetite or Overeating :   Not at all   Feeling Bad About Yourself :   Not at all   Trouble Concentrating :   Not at all   Moving or Speaking Slowly :   Not at all   Thoughts Better Off Dead or Hurting Self :   Not at all   Detailed Depression Screen Score :   0    Total Depression Screen Score :   0    JAKE Difficulty with Work, Home, Others :   Not difficult at all   Vanessa Salinas - 7/31/2019 6:37 PM CDT

## 2022-02-16 NOTE — TELEPHONE ENCOUNTER
---------------------  From: Laura Bynum (eRx Pool ([id number]_[facility]))   To: NC Message Pool (32224_[facility] [place]);     Sent: 8/5/2019 9:21:09 AM CDT  Subject: FW: Medication Management   Due Date/Time: 8/3/2019 1:28:00 PM CDT     Pt requesting generic. Please advise.         ** Not Approved: Request responded to by other means **  levothyroxine (SYNTHROID 0.075 MG TABLET)  TAKE ONE TABLET BY MOUTH EVERY MORNING AS DIRECTED  Qty:  90 unknown unit        Days Supply:  0        Refills:  0          JESÚS     Route To Pharmacy - [facility]   Signed by Laura Bynum            ** Patient matched by Laura Bynum on 8/5/2019 9:18:47 AM CDT **      ------------------------------------------  From: [facility]  To: Emilie Lassiter  Sent: August 2, 2019 1:28:40 PM CDT  Subject: Medication Management  Due: August 3, 2019 1:28:40 PM CDT    ** On Hold Pending Signature **  Drug: levothyroxine (Synthroid 75 mcg (0.075 mg) oral tablet)  TAKE ONE TABLET BY MOUTH EVERY MORNING AS DIRECTED  Quantity: 90 unknown unit  Days Supply: 0         Refills: 0  JESÚS  Notes from Pharmacy:     Dispensed Drug: levothyroxine (Synthroid 75 mcg (0.075 mg) oral tablet)  TAKE ONE TABLET BY MOUTH EVERY MORNING AS DIRECTED  Quantity: 90 unknown unit  Days Supply: 0         Refills: 0  JESÚS  Notes from Pharmacy:     ** On Hold Pending Signature **  Drug: levothyroxine (Synthroid 75 mcg (0.075 mg) oral tablet)  TAKE ONE TABLET BY MOUTH EVERY MORNING AS DIRECTED  Quantity: 90 unknown unit  Days Supply: 0         Refills: 0  JESÚS  Notes from Pharmacy: PT HAS BEEN  ON LEVOTHYROXINE PREVIOUS AND SINCE THE PRICING OF SYNTHROID IS SIGNIFICANTLY MORE, PT IS HOPING TO CONTINUE WITH  GNERIC AT SIGNIFICANT SAVINGS    Dispensed Drug: levothyroxine (Synthroid 75 mcg (0.075 mg) oral tablet)  TAKE ONE TABLET BY MOUTH EVERY MORNING AS DIRECTED  Quantity: 90 unknown unit  Days Supply: 0         Refills: 0  JESÚS  Notes from Pharmacy:  PT HAS BEEN  ON LEVOTHYROXINE PREVIOUS AND SINCE THE PRICING OF SYNTHROID IS SIGNIFICANTLY MORE, PT IS HOPING TO CONTINUE WITH  GNERIC AT SIGNIFICANT SAVINGS  ---------------------------------------------------------------  From: Mercedes Lux   To: [facility]    Sent: 8/5/2019 4:03:52 PM CDT  Subject: FW: Medication Management     ** Submitted: **  Order:levothyroxine (Synthroid 75 mcg (0.075 mg) oral tablet)  1 tab(s)  Oral  qam  Qty:  90 tab(s)        Refills:  3          Substitutions Allowed     Route To Pharmacy - [facility]    Signed by Mercedes Lux  8/5/2019 4:02:00 PM    ** Submitted: **  Complete:levothyroxine (Synthroid 75 mcg (0.075 mg) oral tablet)   Signed by Mercedes Lux  8/5/2019 4:03:00 PM    ** Not Approved:  **  levothyroxine (SYNTHROID 0.075 MG TABLET)  TAKE ONE TABLET BY MOUTH EVERY MORNING AS DIRECTED  Qty:  90 unknown unit        Days Supply:  0        Refills:  0          JESÚS     Route To Pharmacy - [facility]   Note from Pharmacy:  PT HAS BEEN  ON LEVOTHYROXINE PREVIOUS AND SINCE THE PRICING OF SYNTHROID IS SIGNIFICANTLY MORE, PT IS HOPING TO CONTINUE WITH  GNERIC AT SIGNIFICANT SAVINGS  Signed by Mercedes Lux

## 2022-02-16 NOTE — PROGRESS NOTES
Patient:   DUSTIN EDDY            MRN: [id number]            FIN: [id number]               Age:   57 years     Sex:  Female     :  1960   Associated Diagnoses:   Pre-op exam; Right knee pain   Author:   Emilie Lassiter      Preoperative Information   Here for preop history and physical      Chief Complaint   1/15/2018 3:55 PM CST    Pre- op for Right knee surgery with Dr. Peters on 17 at [facility]   She has done well with anesthesia in the saad for colonoscopies  States she will be having an epidural      Review of Systems   Constitutional:  Negative.    Weight has been stable, no nutritional concerns  No Allergies to latex, iodine, contrast dye, shell fish or local anesthetics   Eye:  Negative.    Ear/Nose/Mouth/Throat:  Negative.    Respiratory:  No shortness of breath, No cough, No wheezing.    No history of sleep apnea   Cardiovascular:  No chest pain, No tachycardia, No peripheral edema.    Gastrointestinal:  No nausea, No vomiting.    Genitourinary:  Negative.    Pregnancy ruled out-post menopausal   Hematology/Lymphatics:  No bruising tendency, No bleeding tendency.    Endocrine:  Negative.    Immunologic:  Negative.    Musculoskeletal:  right knee pain.    Integumentary:  Negative.    Neurologic:  Negative.    Psychiatric:  Negative.    All other systems reviewed and negative      Health Status   Allergies:    Allergic Reactions (Selected)  No Known Medication Allergies   Problem list:    All Problems  Hypothyroidism / SNOMED CT 212160595 / Confirmed  Atypical squamous cells of undetermined significance (ASCUS) on Papanicolaou smear of cervix / SNOMED CT 3490174890 / Confirmed  Rectal mass / SNOMED CT 157388966 / Confirmed  Osteopenia / SNOMED CT 626438944 / Confirmed  Knee pain / SNOMED CT 60079318 / Confirmed  Resolved: Pregnancy / SNOMED CT 445246144  Resolved: Pregnancy / SNOMED CT 819904603  Resolved: Pregnancy / SNOMED CT 459299209   Medications:  (Selected)  "  Prescriptions  Prescribed  Synthroid 75 mcg (0.075 mg) oral tablet: 1 tab(s) ( 75 mcg ), po, daily, # 90 tab(s), 3 Refill(s), JESÚS, Type: Maintenance, Pharmacy: [facility] [id number] [facility]  triamcinolone 0.1% topical cream: 1 gabino, TOP, TID, Instructions: to left palm, # 30 g, 0 Refill(s), Type: Maintenance, Pharmacy: [id number], 1 gabino top tid,Instr:to left palm      Histories   Past Medical History:    Active  Atypical squamous cells of undetermined significance (ASCUS) on Papanicolaou smear of cervix (3132906816): Onset on 2010 at 50 years.  Hypothyroidism (950433643)  Knee pain (05408482)  Osteopenia (344487872)  Resolved  Pregnancy (233838157):  Resolved in the month of 1985 at 25 years.  Pregnancy (106598921):  Resolved in the month of 1987 at 27 years.  Pregnancy (451097409):  Resolved in the month of 1992 at 32 years.   Family History:    Breast cancer  Mother  Grandmother (P)  Aunt (M)  Osteoporosis  Mother  COPD  Mother  Father ()  High blood pressure  Mother  Allergy  Mother  CAD - Coronary artery disease  Father ()     Procedure history:    Flexible sigmoidoscopy (SNOMED CT 49401088) on 2016 at 56 Years.  Comments:  2016 6:45 PM - Darlene Berger MA  [facility]  Findings:   no polyps seen  Recommendation:   f/u flex sig in 1yr; f/u colonoscopy due 2018  Colonoscopy (ICD-9-CM 45.23) performed by Abigail Brooks MD on 2015 at 55 Years.  Comments:  2016 12:54 PM - Nevin Matute  Indication: Previous adenomatous polyps.  Sedation: Fentanyl 0.1 and Midazolam 2 mg IV.  Recommendation: Repeat in 3 years.   Flexible sigmoidoscopy in 1  year to inspect transanal excision site.  Colonoscopy (SNOMED CT 083568757) performed by Hang Calderón MD on 2014 at 54 Years.  Comments:  2014 3:44 PM - Alyson Roque RN  Sedation: midazolam, fentanyl  Indication: screening  30mm Tubulovillous adenoma, consistent with \"advance " "adenoma\", no high grade dysplasia  Referred to colorectal associates  Colonoscopy [place] on 10/22/2002 at 42 Years.  Comments:  5/7/2013 11:19 AM - Jessica TORRES, Stephanie parham  DEXA needs repeat 2018.  Vaginal delivery X 3.   Social History:        Alcohol Assessment            4 times per week, 1 glass of wine with dinner.      Tobacco Assessment            Never      Substance Abuse Assessment            Never      Employment and Education Assessment            Employed, Work/School description: .      Home and Environment Assessment            Marital status: .  Spouse/Partner name: Humberto.  Lives with Spouse.  3 children.      Nutrition and Health Assessment            Type of diet: Regular.      Exercise and Physical Activity Assessment            Exercise frequency: 5-6 times/week.  Exercise type: Bicycling, eliptical, rowing machine.      Sexual Assessment            Sexually active: Yes.  Sexual orientation: Heterosexual.  ,        Alcohol Assessment            4 times per week, 1 glass of wine with dinner.      Tobacco Assessment            Never      Substance Abuse Assessment            Never      Employment and Education Assessment            Employed, Work/School description: .      Home and Environment Assessment            Marital status: .  Spouse/Partner name: Humberto.  Lives with Spouse.  3 children.      Nutrition and Health Assessment            Type of diet: Regular.      Exercise and Physical Activity Assessment            Exercise frequency: 5-6 times/week.  Exercise type: Bicycling, eliptical, rowing machine.      Sexual Assessment            Sexually active: Yes.  Sexual orientation: Heterosexual.        Physical Examination   Vital Signs   1/15/2018 3:55 PM CST Peripheral Pulse Rate 64 bpm    Systolic Blood Pressure 110 mmHg    Diastolic Blood Pressure 62 mmHg    Mean Arterial Pressure 78 mmHg      Measurements from flowsheet : Measurements   1/15/2018 3:55 " PM CST Height Measured - Standard 59 in    Weight Measured - Standard 139.8 lb    BSA 1.62 m2    Body Mass Index 28.23 kg/m2  HI      General:  Alert and oriented, No acute distress.    Eye:  Normal conjunctiva.    HENT:  Normocephalic, Tympanic membranes are clear, Oral mucosa is moist, No pharyngeal erythema, Mallampati Score.    Neck:  Supple, Non-tender, No carotid bruit, No jugular venous distention, No lymphadenopathy, No thyromegaly.    Respiratory:  Breath sounds are equal, Symmetrical chest wall expansion.         Respirations: Are within normal limits.         Pattern: Regular.         Breath sounds: Bilateral, Within normal limits.    Cardiovascular:  Normal rate, Regular rhythm, No murmur, Good pulses equal in all extremities, Normal peripheral perfusion, No edema.    Gastrointestinal:  Soft, Non-tender, Non-distended.    Musculoskeletal:  Normal strength.    Integumentary:  Warm, Dry, Intact, No rash.    Neurologic:  Alert, Oriented, Normal motor function, No focal deficits.    Psychiatric:  Cooperative, Appropriate mood & affect.       Review / Management   No family history of bleeding tendencies, thrombophilias, or anesthesia complications.  No personal history of bleeding tendencies, thrombophilias, anesthesia complications, or valvular disease.   ECG interpretation:  Time  1/15/2018 4:24:00 PM, completed today.       Impression and Plan   Diagnosis     Pre-op exam (URK37-TG Z01.818).     Right knee pain (NQP74-ES M25.561).     Condition:  Stable, no contraindications to procedure/surgery pending hemoglobin.    Orders     No SBE prophylaxis or DVT prophylaxis necessary.     Informed patient to avoid aspirin and ibuprofen type products 10 days prior to surgery.

## 2022-02-16 NOTE — TELEPHONE ENCOUNTER
---------------------  From: Emilie Lassiter   To: [facility] Message Pool ([id number]_[facility]); DUSTIN EDDY    Sent: 8/4/2021 9:23:32 AM CDT  Subject: General Message     Your pap smear is normal and the screening test for high risk strains of human papilloma virus is negative.  These tests should be repeated in 4 years.  You should return in one year for your annual physical , but a pap smear will not be necessary at that time.

## 2022-02-16 NOTE — TELEPHONE ENCOUNTER
---------------------  From: Darling Marte CMA (Phone Messages Pool ([id number]_[facility]))   To: [facility] Message Pool (19475_[facility] [place][note 2]);     Sent: 4/5/2021 8:47:03 AM CDT  Subject: blood in stool     Phone Message    PCP:   IVAN      Time of Call:  0835       Person Calling:  pt  Phone number:  [phone] (until 1600)    Returned call at: _    Note:   Pt states she has noticed blood in her stool. Has hx of this and has had colonoscopies - last in 2018 and repeat in 5 years. States she has had normal stools, denies any pain. Does have increased stress lately and not sleeping well.     [facility]x of seeing [facility] She will reach out to them as well.     Last office visit and reason:  7/30/19 annual NCB---------------------  From: Catherine Aguilar LPN ([facility] Message Pool (50916_[facility] [place][note 2]))   To: Emilie Lassiter;     Sent: 4/5/2021 11:50:57 AM CDT  Subject: FW: blood in stoolagree with reaching out specialist as outlined belowPt notified. She has not called specialist yet. She will try # provided and if needing more assistance or another referral she will let us know.

## 2022-02-16 NOTE — TELEPHONE ENCOUNTER
---------------------  From: Emilie Lassiter   To: DUSTIN EDDY    Sent: 11/27/2020 10:50:03 AM CST  Subject: General Message     Lawson Ayala,  A message was left on your phone line that your COVID19 test is negative. I hope you are feeling better and am sorry for the delay in your results. Take care.    DILCIA Blake

## 2022-02-16 NOTE — TELEPHONE ENCOUNTER
---------------------  From: Emilie Lassiter   To: DUSTIN EDDY    Sent: 7/25/2020 4:08:13 PM CDT  Subject: Patient Message - Results Notification        Labs look good, Dustin Ayala. It looks like you are due for a visit/video visit with me.  I did send in the levothyroxine at the same dose, thanks    DILCIA Blake    Results:  Date Result Name Ind Value Ref Range   7/24/2020 7:59 AM Cholesterol ((H)) 213 mg/dL ( - <200)   7/24/2020 7:59 AM Non-HDL Cholesterol ((H)) 158 ( - <130)   7/24/2020 7:59 AM HDL  55 mg/dL (> OR = 50 - )   7/24/2020 7:59 AM Cholesterol/HDL Ratio  3.9 ( - <5.0)   7/24/2020 7:59 AM LDL ((H)) 130    7/24/2020 7:59 AM Triglyceride ((H)) 168 mg/dL ( - <150)   7/24/2020 7:59 AM TSH  3.85 mIU/L (0.40 - 4.50)

## 2022-02-16 NOTE — TELEPHONE ENCOUNTER
---------------------  From: Yudy Neil CMA (Phone Messages Pool ([id number]_[facility]))   To: [employer] Message Pool ([id number]_[facility]);     Sent: 7/15/2021 8:04:28 AM CDT  Subject: FW: Prolapsed something           ---------------------  From: DUSTIN MUHAMMAD  To: [facility]  Sent: 07/14/2021 07:38 p.m. CDT  Subject: Prolapsed something  Tian Orellana,  Do you think I should see someone about my prolapsed, whatever you mentioned? My mom had surgery related to that, as did my cousin and my mom s sister, my aunt. Please let me know.  Thank you,  Dustin Muhammad---------------------  From: Catherine Aguilar LPN ([employer] Message Pool ([id number]_[facility]))   To: Emilie Lassiter;     Sent: 7/15/2021 8:27:36 AM CDT  Subject: FW: Prolapsed something---------------------  From: Emilie Lassiter   To: DUSTIN MUHAMMAD    Sent: 7/15/2021 8:53:11 AM CDT  Subject: RE: Prolapsed something     Lawson Ayala,  A prolapse of the uterus or the bladder or the colon is not inherited.  Your uterus seemed a bit prolapsed to me during your pelvic exam. The only reason to pursue any treatment is because it is causing symptoms for you (heaviness in the pelvic area, urinary frequency or sometimes other urinary symptoms).  If you have symptoms and want to see a GYN specialist to further evaluate and treat, I am happy to put a referral in for that. Dr Jonah Montalvo is our GYN specialist here in [place] at [facility] and he would be happy to repeat the exam and discuss treatment options with you.  All you need to do is call our normal scheduling line and ask to get on his schedule. # [phone]    Hope that helps, nice to see you.    DILCIA Blake

## 2022-02-16 NOTE — TELEPHONE ENCOUNTER
---------------------  From: Winter Pino LPN (Phone Messages Pool ([id number]))   To: [facility]B Message Pool ([id number]);     Sent: 8/2/2019 1:52:20 PM CDT  Subject: CONSUMER MESSAGE FW: Prescription for Levothyroxine     Please advise. Per chart alert pt to have synthroid and not generic.         ---------------------  From: DUSTIN EDDY  To: [facility]  Sent: 08/02/2019 01:20 p.m. CDT  Subject: Prescription for Levothyroxine  Hello, We tried to  my prescription from [facility] today in [place], but I currently cannot afford it, because the prescription says it will not allow a generic version. Could you please prescribe the generic version, so it is not so costly for me? I would like to pick it up ASA, as I am currently out of it.    Thank you.---------------------  From: Mercedes Lux ([facility] Message Pool ([id number]))   To: Emilie Lassiter;     Sent: 8/2/2019 1:53:10 PM CDT  Subject: FW: CONSUMER MESSAGE FW: Prescription for Levothyroxine---------------------  From: Emilie Lassiter   To: DUSTIN EDDY    Sent: 8/2/2019 2:14:46 PM CDT  Subject: RE: CONSUMER MESSAGE FW: Prescription for Levothyroxine     Lawson Ayala,  I just sent a prescription for the generic formulation for Synthroid, sorry for the mix up, I thought you preferred the Brand name. Have a great weekend.    DILCIA Blake---------------------  From: Emilie Lassiter   To: [employer] Message Pool ([id number]);     Sent: 8/2/2019 2:15:05 PM CDT  Subject: RE: CONSUMER MESSAGE FW: Prescription for Levothyroxine     please remove or change chart alert, thanks

## 2022-02-16 NOTE — TELEPHONE ENCOUNTER
---------------------  From: Darling Marte CMA   To: [employer] Message Pool ([id number]_[facility]);     Sent: 8/1/2019 2:12:37 PM CDT  Subject: 7/30 lab results     Phone Message    PCP:   IVAN      Time of Call:  1338        Person Calling:  pt  Phone number:  [phone]    Returned call at: _    Note:   Pt looking for lab results from 7/30. Results in but no letter yet. Lipid panel elevated. Please let pt know NCB plan.     Last office visit and reason:  _---------------------  From: Mercedes Lux ([employer] Message Pool ([id number]_[facility]))   To: Emilie Lassiter;     Sent: 8/1/2019 2:33:27 PM CDT  Subject: FW: 7/30 lab results     (also filled out insurance form to be faxed)letter sentcalled pt and LVM as well.

## 2022-02-16 NOTE — PROGRESS NOTES
Patient:   DUSTIN EDDY            MRN: [id number]            FIN: [id number]               Age:   58 years     Sex:  Female     :  1960   Associated Diagnoses:   Well adult; Hypothyroidism   Author:   Emilie Lassiter      Chief Complaint   2018 8:01 AM CDT    Pt here for annual px        Well Adult History   Well Adult History             The patient presents for well adult exam, doing well, she retired from [employer]  exercises many times per week with may modalities, wt stable  needs lipids and glucose for insurance purposes  Needs TSH for hypothyroid  DEXA 3 years osteopenia, will repeat  Had colonoscopy this past month, to repeat in 5 years  Had an eye surgery and knee replacement this last year  Distant hx of ASCUS pap, had normal with HPV not detected 3 years ago, will defer.  The general health status is good.  The patient's diet is described as balanced.  Exercise: routine.  Associated symptoms consist of none.  Last menstrual period: post menopausal.  Medical encounters:.        Review of Systems   Constitutional:  Negative except as documented in history of present illness.    Eye:  Negative except as documented in history of present illness.    Respiratory:  Negative except as documented in history of present illness.    Cardiovascular:  Negative except as documented in history of present illness.    Breast:  Negative.    Gastrointestinal:  Negative except as documented in history of present illness.    Genitourinary:  Negative except as documented in history of present illness.    Gynecologic:  Negative except as documented in history of present illness.    Hematology/Lymphatics:  Negative except as documented in history of present illness.    Endocrine:  Negative except as documented in history of present illness.    Immunologic:  Negative except as documented in history of present illness.    Musculoskeletal:  Negative except as documented in history of present illness.    Integumentary:   Negative except as documented in history of present illness.    Neurologic:  Negative except as documented in history of present illness.    Psychiatric:  Negative except as documented in history of present illness.              Health Status   Allergies:    Allergic Reactions (Selected)  No Known Medication Allergies   Medications:  (Selected)   Prescriptions  Prescribed  Synthroid 75 mcg (0.075 mg) oral tablet: 1 tab(s) ( 75 mcg ), po, daily, # 90 tab(s), 3 Refill(s), JESÚS, Type: Maintenance, Pharmacy: [place] [id number] [facility]   Problem list:    All Problems  Hypothyroidism / SNOMED CT 087103708 / Confirmed  Knee pain / SNOMED CT 11414931 / Confirmed  Atypical squamous cells of undetermined significance (ASCUS) on Papanicolaou smear of cervix / SNOMED CT 3880483614 / Confirmed  Rectal mass / SNOMED CT 726856866 / Confirmed  Osteopenia / SNOMED CT 898670270 / Confirmed  Osteoarthritis / SNOMED CT 4465291932 / Confirmed  Resolved: Pregnancy / SNOMED CT 509540636  Resolved: Pregnancy / SNOMED CT 486566709  Resolved: Pregnancy / SNOMED CT 659049001  Resolved: Inpatient stay / SNOMED CT 805738206  @[place], [place] - Primary osteoarthritis of right knee      Histories   Past Medical History:    Active  Atypical squamous cells of undetermined significance (ASCUS) on Papanicolaou smear of cervix (1490455528): Onset on 4/19/2010 at 50 years.  Hypothyroidism (608018378)  Knee pain (60064935)  Osteopenia (525272569)  Osteoarthritis (8424300642)  Resolved  Inpatient stay (460773571): Onset on 1/31/2018 at 57 years.  Resolved on 2/2/2018 at 57 years.  Comments:  2/13/2018 CST 7:53 AM CST - Gloria Almonte  @[facility], [place] - Primary osteoarthritis of right knee  Pregnancy (611668025):  Resolved in the month of 6/1985 at 25 years.  Pregnancy (024472929):  Resolved in the month of 6/1987 at 27 years.  Pregnancy (292703777):  Resolved in the month of 7/1992 at 32 years.   Family History:    Brother    History  "is negative.  Brother    History is negative.  Grandmother (P)  Breast cancer  Aunt (M)  Breast cancer  Mother  COPD  Allergy  Breast cancer  Osteoporosis  High blood pressure  Father:  ()  Age at death unknown.   CAD - Coronary artery disease  COPD     Procedure history:    Arthroplasty of knee (SNOMED CT 25670686) performed by Lorraine on 2018 at 57 Years.  Comments:  2018 7:55 AM - Gloria Almonte  Right  Flexible sigmoidoscopy (SNOMED CT 55099443) on 2016 at 56 Years.  Comments:  2016 6:45 PM - Darlene Berger MA  [facility]  Findings:   no polyps seen  Recommendation:   f/u flex sig in 1yr; f/u colonoscopy due 2018  Colonoscopy (ICD-9-CM 45.23) performed by Abigail Brooks MD on 2015 at 55 Years.  Comments:  2016 12:54 PM - Nevin Matute  Indication: Previous adenomatous polyps.  Sedation: Fentanyl 0.1 and Midazolam 2 mg IV.  Recommendation: Repeat in 3 years.   Flexible sigmoidoscopy in 1  year to inspect transanal excision site.  Colonoscopy (SNOMED CT 206027753) performed by Hang Calderón MD on 2014 at 54 Years.  Comments:  2014 3:44 PM - Alyson Roque RN  Sedation: midazolam, fentanyl  Indication: screening  30mm Tubulovillous adenoma, consistent with \"advance adenoma\", no high grade dysplasia  Referred to colorectal [facility]  Colonoscopy [place] on 10/22/2002 at 42 Years.  Comments:  2013 11:19 AM - Stephanie Lopez MA  normal  DEXA needs repeat .      Physical Examination   Vital Signs   2018 8:01 AM CDT Temperature Tympanic 98.0 DegF    Peripheral Pulse Rate 68 bpm    Pulse Site Radial artery    HR Method Manual    Systolic Blood Pressure 112 mmHg    Diastolic Blood Pressure 74 mmHg    Mean Arterial Pressure 87 mmHg    BP Site Right arm    BP Method Manual      Measurements from flowsheet : Measurements   2018 8:01 AM CDT Height Measured - Standard 59 in    Weight Measured - Standard 138.2 lb    BSA " 1.61 m2    Body Mass Index 27.91 kg/m2  HI      General:  Alert and oriented, No acute distress, vital signs stable, as noted above.    Eye:  Pupils are equal, round and reactive to light, Extraocular movements are intact, Normal conjunctiva.    HENT:  Normocephalic, Tympanic membranes are clear, Normal hearing, Oral mucosa is moist, No pharyngeal erythema.    Neck:  Supple, Non-tender, No lymphadenopathy, No thyromegaly.    Respiratory:  Lungs are clear to auscultation, Respirations are non-labored, Breath sounds are equal, Symmetrical chest wall expansion.    Cardiovascular:  Normal rate, Regular rhythm, No murmur, No edema.    Breast:  No mass, No tenderness, No discharge, Breasts examined .  No infra nor supraclavicular nodes palpable.  No axillary nodes or masses palpable.  No nipple discharge. Breasts normal throughout.    Gastrointestinal:  Soft, Non-tender, Non-distended, No organomegaly.    Musculoskeletal:  Normal range of motion, Normal strength, No deformity, Normal gait.    Integumentary:  Warm, Dry, Pink, Intact, No rash.    Neurologic:  Alert, Oriented, Normal sensory, Normal motor function, Cranial Nerves II-XII are grossly intact.    Psychiatric:  Cooperative, Appropriate mood & affect, Normal judgment, PHQ 9/CAGE questionaire reviewed and discussed with patient,  see score.       Impression and Plan   Diagnosis     Well adult (HEJ29-TW Z00.00).     Hypothyroidism (OEL61-DW E06.3).     Patient Instructions:       Counseled: Patient, Regarding diagnosis, Regarding medications, Verbalized understanding, counseled on health benefits of healthy weight, regular exercise, healthy diet.    Orders

## 2022-02-16 NOTE — PROGRESS NOTES
Patient:   DUSTIN EDDY            MRN: [id number]            FIN: [id number]               Age:   60 years     Sex:  Female     :  1960   Associated Diagnoses:   Close exposure to 2019 novel coronavirus; Fatigue; Muscle ache; Rhinorrhea   Author:   Emilie Lassiter      Visit Information      Date of Service: 2020 07:36 am  Performing Location: [facility]  Encounter#: [id number]      Primary Care Provider (PCP):  [id number] -UNKNOWN,    NPI# [id number]      Referring Provider:  Emilie Lassiter    NPI# [id number]   Visit type:  video.    Participants in room during visit:  _pt   Location of patient:  _home  Location of provider:  _ clinic  Video Start Time:  1255  Video End Time:   _105    Today's visit was conducted via video conference due to the COVID-19 pandemic.  The patient's consent to proceed with a video visit has been obtained and documented.      Chief Complaint   2020 12:23 PM CST  would like to be tested for COVID, lower back pain, joints hurt, some chest discomfort, two co-workers tested positive and three are pending results        History of Present Illness   Patient is a _60 year old female_ who is being evaluated via a billable video visit.  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 Last exposure to them was 2 days ago. She had onset of symptoms 3 days ago and has been home form work. Symptoms include muscle aches, funny nose and fatigue. NO fever or loss of taste or smell  this will be her first covid19 test  using some motrin for aches  no SOB or cough      Health Status   Allergies:    Allergic Reactions (Selected)  No Known Medication Allergies   Medications:  (Selected)   Prescriptions  Prescribed  Synthroid 75 mcg (0.075 mg) oral tablet: = 1 tab(s), Oral, qam, # 90 tab(s), 3 Refill(s), Type: Maintenance, Pharmacy: [facility], OK TO DISPENSE GENERIC/WHATEVER IS MORE AFFORDABLE, 1 tab(s) Oral qam, 59, in, 19 8:46:00 CDT,  Height Measured, 139.6, lb, 07/30/19 8:46:00 CDT, Weight M...  Xanax 1 mg oral tablet: See Instructions, Instructions: take 1 tab 60 min before dental work , must have  for dentist appointment, PRN: for anxiety, # 2 EA, 0 Refill(s), Type: Maintenance, Pharmacy: [facility], cancel first rx please, she has 2 procedures coming up,...  amoxicillin 500 mg oral tablet: See Instructions, Instructions: 4 tabs one hour before dental work, # 4 EA, 1 Refill(s), Type: Maintenance, Pharmacy: [facility], 4 tabs one hour before dental work  triamcinolone 0.1% topical cream: 1 gabino, TOP, TID, Instructions: to left palm--do not fill till she calls, # 30 g, 0 Refill(s), Type: Maintenance, Pharmacy: [facility], 1 gabino Topical tid,Instr:to left palm--do not fill till she calls,    Medications          *denotes recorded medication          Xanax 1 mg oral tablet: See Instructions, take 1 tab 60 min before dental work , must have  for dentist appointment, PRN: for anxiety, 2 EA, 0 Refill(s).          amoxicillin 500 mg oral tablet: See Instructions, 4 tabs one hour before dental work, 4 EA, 1 Refill(s).          Synthroid 75 mcg (0.075 mg) oral tablet: 1 tab(s), Oral, qam, 90 tab(s), 3 Refill(s).          triamcinolone 0.1% topical cream: 1 gabino, TOP, TID, to left palm--do not fill till she calls, 30 g, 0 Refill(s).       Problem list:    All Problems  Acute lyme disease / SNOMED CT 4052006002 / Confirmed  Atypical squamous cells of undetermined significance (ASCUS) on Papanicolaou smear of cervix / SNOMED CT 6051546177 / Confirmed  Hypothyroidism / SNOMED CT 974691911 / Confirmed  Osteoarthritis / SNOMED CT 0754243373 / Confirmed  Osteopenia / SNOMED CT 708876678 / Confirmed      Histories   Past Medical History:    Active  Atypical squamous cells of undetermined significance (ASCUS) on Papanicolaou smear of cervix (0896251226): Onset on 4/19/2010 at 50 years.  Hypothyroidism (373297775)  Osteopenia  (540157952)  Osteoarthritis (1860136717)  Resolved  Inpatient stay (851437022): Onset on 2018 at 57 years.  Resolved on 2018 at 57 years.  Comments:  2018 CST 7:53 AM JOSE MARIA - Gloria Almonte  @[facility] [place], [place] - Primary osteoarthritis of right knee  Knee pain (13452554):  Resolved.  Rectal mass (293901787):  Resolved.  Pregnancy (064171697):  Resolved in the month of 1985 at 25 years.  Pregnancy (533371735):  Resolved in the month of 1987 at 27 years.  Pregnancy (385390108):  Resolved in the month of 1992 at 32 years.   Family History:    Breast cancer  Mother  Grandmother (P)  Aunt (M)  Osteoporosis  Mother  COPD  Mother  Father ()  High blood pressure  Mother  Allergy  Mother  CAD - Coronary artery disease  Father ()     Procedure history:    Mammogram (SNOMED CT 028067893) on 2020 at 60 Years.  Comments:  2020 11:08 AM CDT - Catherine Aguilar LPN  ACR 1 Negative. Recommend annual mammograms.  Colonoscopy (SNOMED CT 452287423) on 2018 at 58 Years.  Comments:  2018 9:27 AM CDT - Mercedes Lux  Repeat 5 years- [facility]  Arthroplasty of knee (SNOMED CT 72454048) performed by Lorraine on 2018 at 57 Years.  Comments:  2018 7:55 AM JOSE MARIA - Gloria Almonte  Right  Eye surgery (SNOMED CT 3298283982) on 2017 at 57 Years.  Flexible sigmoidoscopy (SNOMED CT 56600246) on 2016 at 56 Years.  Comments:  2016 6:45 PM CDT - Ab TORRES, Darlene  [facility]  Findings:   no polyps seen  Recommendation:   f/u flex sig in 1yr; f/u colonoscopy due 2018  Colonoscopy (ICD-9-CM 45.23) performed by Abigail Brooks MD on 2015 at 55 Years.  Comments:  2016 12:54 PM CST - Nevin Matute  Indication: Previous adenomatous polyps.  Sedation: Fentanyl 0.1 and Midazolam 2 mg IV.  Recommendation: Repeat in 3 years.   Flexible sigmoidoscopy in 1  year to inspect transanal excision site.  Colonoscopy (SNOMED CT 363472657)  "performed by Hang Calderón MD on 6/2/2014 at 54 Years.  Comments:  6/6/2014 3:44 PM CDT - Alyson Roque RN  Sedation: midazolam, fentanyl  Indication: screening  30mm Tubulovillous adenoma, consistent with \"advance adenoma\", no high grade dysplasia  Referred to [facility]  Colonoscopy Nicola on 10/22/2002 at 42 Years.  Comments:  5/7/2013 11:19 AM CDT - Stephanie Lopez MA  normal  DEXA needs repeat 2018.   Social History:        Electronic Cigarette/Vaping Assessment: Denies Electronic Cigarette Use            Electronic Cigarette Use: Never.      Alcohol Assessment            Wine (5 oz), 3-5 times per week, 1 drinks/episode average.  Ready to change: No.      Tobacco Assessment: Denies Tobacco Use            Never (less than 100 in lifetime)      Substance Abuse Assessment            Never      Employment and Education Assessment            Employed, Work/School description: .  Highest education level: University degree(s).      Home and Environment Assessment            Marital status: .  Spouse/Partner name: Humberto.  Lives with Spouse.  3 children.  Injuries/Abuse/Neglect               in household: No.  Feels unsafe at home: No.  Family/Friends available for support: Yes.      Nutrition and Health Assessment            Type of diet: Regular.  Wants to lose weight: Yes.  Sleeping concerns: No.  Feels highly stressed: No.      Exercise and Physical Activity Assessment            Exercise frequency: 5-6 times/week.  Exercise type: Bicycling, eliptical, rowing machine.      Sexual Assessment            Sexually active: Yes.  Sexual orientation: Heterosexual.        Physical Examination   General:  Alert and oriented, No acute distress.    Eye:  Normal conjunctiva.    Respiratory:  Respirations are non-labored.    Psychiatric:  Cooperative, Appropriate mood & affect, Normal judgment.       Impression and Plan   Diagnosis     Close exposure to 2019 novel coronavirus (YYG97-IA " Z20.828).     Fatigue (HPJ82-YQ R53.83).     Muscle ache (ZDN66-MY M79.10).     Rhinorrhea (ISR94-AG J34.89).     Patient Instructions:       Counseled: Patient.    Orders     Orders (Selected)   Outpatient Orders  Ordered (Dispatched)  SARS-CoV-2 RNA (COVID-19), Qualitative NAAT* ([facility]): Specimen Type: Nasopharyngeal Swab, Collection Date: 11/19/20 13:02:00 CST.     Patient is referred for [facility] COVID-19 testing and is instructed of the following:  Patient should remain isolated until results of test return and given that tests are not 100% accurate, would be safest to assume that they are contagious with COVID-19 until their symptoms have fully resolved. Isolation is recommended for at least 7 days from the onset of symptoms and for 3 days after resolution of fevers and productive cough, unless test is positive, or exposure with COVID positive contact is confirmed, then isolation should be for 14 days. This means patient should not go to work or any public areas. In addition, it is recommended at home that they separate themselves from other people and from animals as much as possible, including using a separate bathroom. If they do need to be around others, a face mask is recommended. Frequent hand hygiene and cleaning of high touch surfaces is also recommended.   Symptoms can last for several weeks. For patients with COVID-19, they can sometimes start to improve and then get worse again. If symptoms worsen at any time, including significant shortness of breath, low oxygen levels, high fevers that cannot be controlled, or concerns for dehydration, they should seek medical care. If going to the ER, calling 911, or seeking care at the clinic, they are reminded to notify staff that they have been tested for COVID-19.  Patient also is informed that testing will be done in their car at a scheduled time.   Patient is also informed that testing for COVID-19 must be reported to the public health department along with  contact information for the patient.   Patient information is given to scheduling staff to get patient scheduled for testing. Patient will receive further instructions from scheduling staff.  Patient is encouraged to call back at any time with questions or concerns.  .

## 2022-02-16 NOTE — TELEPHONE ENCOUNTER
---------------------  From: Laura Bynum   To: [employer] Message Pool ([id number]_[facility]);     Sent: 5/14/2020 8:35:29 AM CDT  Subject: Medication Request     Phone Message    PCP:   IVAN    Time of Call:  8:05       Person Calling:  pt  Phone number:  [phone]  Returned call at:   Last office visit and reason:  px 7/30/19    Note: Pt has a dentist appt next week for a crown. Pt has anxiety going to the dentist and is asking for a prescription of Xanax to be sent to the pharmacy. The dentist will not use gas on the pt due to COVID-19.     Pt is also wondering if she should take an ABX after her procedure due to her knee replacement.---------------------  From: Cecilia Buck CMA ([employer] Message Pool ([id number]_[facility]))   To: Emilie Lassiter;     Sent: 5/14/2020 8:57:13 AM CDT  Subject: FW: Medication Request---------------------  From: Emilie Lassiter   To: [employer] Message Pool ([id number]_[facility]);     Sent: 5/14/2020 12:27:06 PM CDT  Subject: RE: Medication Request     I sent rx for amoxicillin, she should take all four tabs 1 hour before dental procedure  I sent rx for 1 tab of Xanax, I don't see that we have prescribed for her in the past but it sounds like she has used it in the past. Please confirm that, let her know it will make her sleepy, she must NOT drive, she will have to have a  for the dental appt, take it 1 hour before dental appt---------------------  From: Cecilia Buck CMA ([employer] Message Pool ([id number]_[facility]))   To: Emilie Lassiter;     Sent: 5/14/2020 1:24:04 PM CDT  Subject: FW: Medication Request     I called and spoke with Ese. She states she actually is having to go in on two separate days for the procedure. The 2nd date  is 2 weeks after the 1st. She is wondering if she should repeat rx's at that time as well.    She has not taken Xanax in the past but did understand that it will make her sleepy and her and her  had planned on having him drive  her to appt.---------------------  From: Emilie Lassiter   To: [facility] Message Pool ([id number]_[facility]);     Sent: 5/14/2020 1:48:07 PM CDT  Subject: RE: Medication Request     there is a refill on the amox  I re sent the Xanax, please call the pharmacy and have them disregard the first rx I sent, Shikha and pt updated at 2:19pm

## 2022-02-16 NOTE — TELEPHONE ENCOUNTER
** Not Approved: Request responded to by other means **  levothyroxine (SYNTHROID 0.075 MG TABLET)  TAKE ONE TABLET BY MOUTH EVERY MORNING AS DIRECTED  Qty:  90 unknown unit        Days Supply:  0        Refills:  0          JESÚS     Route To Pharmacy - [facility]   Signed by Laura Bynum            ** Patient matched by Laura Bynum on 8/5/2019 9:18:47 AM CDT **      ------------------------------------------  From: [facility]  To: Emilie Lassiter  Sent: August 2, 2019 1:28:40 PM CDT  Subject: Medication Management  Due: August 3, 2019 1:28:40 PM CDT    ** On Hold Pending Signature **  Drug: levothyroxine (Synthroid 75 mcg (0.075 mg) oral tablet)  TAKE ONE TABLET BY MOUTH EVERY MORNING AS DIRECTED  Quantity: 90 unknown unit  Days Supply: 0         Refills: 0  JESÚS  Notes from Pharmacy:     Dispensed Drug: levothyroxine (Synthroid 75 mcg (0.075 mg) oral tablet)  TAKE ONE TABLET BY MOUTH EVERY MORNING AS DIRECTED  Quantity: 90 unknown unit  Days Supply: 0         Refills: 0  JESÚS  Notes from Pharmacy:     ** On Hold Pending Signature **  Drug: levothyroxine (Synthroid 75 mcg (0.075 mg) oral tablet)  TAKE ONE TABLET BY MOUTH EVERY MORNING AS DIRECTED  Quantity: 90 unknown unit  Days Supply: 0         Refills: 0  JESÚS  Notes from Pharmacy: PT HAS BEEN  ON LEVOTHYROXINE PREVIOUS AND SINCE THE PRICING OF SYNTHROID IS SIGNIFICANTLY MORE, PT IS HOPING TO CONTINUE WITH  GNERIC AT SIGNIFICANT SAVINGS    Dispensed Drug: levothyroxine (Synthroid 75 mcg (0.075 mg) oral tablet)  TAKE ONE TABLET BY MOUTH EVERY MORNING AS DIRECTED  Quantity: 90 unknown unit  Days Supply: 0         Refills: 0  JESÚS  Notes from Pharmacy: PT HAS BEEN  ON LEVOTHYROXINE PREVIOUS AND SINCE THE PRICING OF SYNTHROID IS SIGNIFICANTLY MORE, PT IS HOPING TO CONTINUE WITH  GNERIC AT SIGNIFICANT SAVINGS  ------------------------------------------

## 2022-02-16 NOTE — TELEPHONE ENCOUNTER
---------------------  From: Darling Marte CMA   Sent: 11/19/2020 2:02:51 PM CST  Subject: curbside testing     Pt had curbside testing done at the clinic today for PUI for COVID-19 per [facility]. 02 Sat = 97. Specimen sent to [facility] lab. Faxed forms to .

## 2022-02-16 NOTE — NURSING NOTE
CAGE Assessment Entered On:  7/31/2019 6:38 PM CDT    Performed On:  7/30/2019 6:37 PM CDT by Vanessa Salinas               Assessment   Have you ever felt you should cut down on your drinking :   No   Have people annoyed you by criticizing your drinking :   No   Have you ever felt bad or guilty about your drinking :   No   Have you ever taken a drink first thing in the morning to steady your nerves or get rid of a hangover (Eye-opener) :   No   CAGE Score :   0    Vanessa Salinas - 7/31/2019 6:37 PM CDT

## 2022-02-16 NOTE — TELEPHONE ENCOUNTER
---------------------  From: Winter Pino LPN (Phone Messages Pool ([id number]_[facility]))   To: DUSTIN MUHAMMAD    Sent: 8/3/2021 12:50:44 PM CDT  Subject: FW: Bone testing and mammogram     Lawson Ayala,    The orders were sent to [facility] on 7/14/21. They were suppose to contact you to schedule.   You can call [phone]- this is the scheduling number and they can get it set up for you.    Winter STALLWORTH LPN        ---------------------  From: DUSTIN MUHAMMAD  To: [facility]  Sent: 08/03/2021 12:45 p.m. CDT  Subject: Bone testing and mammogram  Tian Orellana,  Was someone going to reach out to me to schedule a bone test and a mammogram? Please let me know.  Thank you.  Dustin Muhammad

## 2022-02-16 NOTE — PROCEDURES
Accession Number:       [id number]  CLINICAL INFORMATION::     None given  LMP::     NONE GIVEN  PREV. PAP::     07/14/2015 WNL  PREV. BX::     NONE GIVEN  SOURCE::     None given  STATEMENT OF ADEQUACY::     Satisfactory for evaluation. Endocervical/transformation zone component present.  INTERPRETATION/RESULT::     See comment       Negative for intraepithelial lesion or malignancy.       Endometrial Cells present in a woman 45 years       of age or older.       Atrophic pattern; predominantly parabasal cells  COMMENT::     See comment       This Pap test has been evaluated with computer       assisted technology.       The clinical significance of endometrial cells       should be interpreted in the context       of menstrual history and reproductive status. If       out of phase of cycle or after menopause, this       finding may be associated with normal functioning       endometrium, benign endometrium with stromal       breakdown, hormonal alterations and, less       commonly, endometrial neoplasia.  Clinical       correlation is suggested.  CYTOTECHNOLOGIST::     KEL MCDERMOTT(ASCP) CT Screening Location: [place] [address]  COMMENT:     See comment       EXPLANATORY NOTE:         The Pap is a screening test for cervical cancer. It is       not a diagnostic test and is subject to false negative       and false positive results. It is most reliable when a       satisfactory sample, regularly obtained, is submitted       with relevant clinical findings and history, and when       the Pap result is evaluated along with historic and       current clinical information.  HPV mRNA E6/E7:     Not Detected       Methodology: Transcription-Mediated Amplification       This assay detects E6/E7 viral messenger RNA (mRNA) from 14       high-risk HPV types (16,18,31,33,35,39,45,51,52,56,58,59,66,68).           The analytical performance characteristics of this       assay have  been determined by [employer].       The modifications have not been cleared or approved       by the FDA. This assay has been validated pursuant       to the CLIA regulations and is used for       clinical purposes.         For additional information, please refer to       http://education.Tales2Go.KartRocket/faq/NAU562b6       (This link if provided for information/       educational purposes only.)

## 2022-02-16 NOTE — NURSING NOTE
Comprehensive Intake Entered On:  7/14/2021 7:45 AM CDT    Performed On:  7/14/2021 7:40 AM CDT by Catherine Aguilar LPN               Summary   Chief Complaint :   annual exam, needing forms completed for health insurance company, due  to have TSH level checked   Advance Directive :   No   Weight Measured :   142.8 lb(Converted to: 142 lb 13 oz, 64.773 kg)    Height Measured :   58.75 in(Converted to: 4 ft 11 in, 149.22 cm)    Body Mass Index :   29.08 kg/m2 (HI)    Body Surface Area :   1.64 m2   Systolic Blood Pressure :   116 mmHg   Diastolic Blood Pressure :   78 mmHg   Mean Arterial Pressure :   91 mmHg   Peripheral Pulse Rate :   81 bpm   BP Site :   Right arm   BP Method :   Manual   Temperature Tympanic :   97.6 DegF(Converted to: 36.4 DegC)  (LOW)    Oxygen Saturation :   95 %   Languages :   English   Catherine Aguilar LPN - 7/14/2021 7:40 AM CDT   Health Status   Allergies Verified? :   Yes   Medication History Verified? :   Yes   Medical History Verified? :   No   Pre-Visit Planning Status :   Completed   Tobacco Use? :   Never smoker   Catherine Aguilar LPN - 7/14/2021 7:40 AM CDT   Meds / Allergies   (As Of: 7/14/2021 7:45:22 AM CDT)   Allergies (Active)   No Known Medication Allergies  Estimated Onset Date:   Unspecified ; Created By:   Penny Lutz CMA; Reaction Status:   Active ; Category:   Drug ; Substance:   No Known Medication Allergies ; Type:   Allergy ; Updated By:   Penny Lutz CMA; Reviewed Date:   7/30/2019 8:56 AM CDT        Medication List   (As Of: 7/14/2021 7:45:22 AM CDT)   Prescription/Discharge Order    ALPRAZolam  :   ALPRAZolam ; Status:   Prescribed ; Ordered As Mnemonic:   Xanax 1 mg oral tablet ; Simple Display Line:   See Instructions, take 1 tab 60 min before dental work , must have  for dentist appointment, PRN: for anxiety, 2 EA, 0 Refill(s) ; Ordering Provider:   Emilie Lassiter; Catalog Code:   ALPRAZolam ; Order Dt/Tm:   5/14/2020 1:45:43 PM CDT           triamcinolone topical  :   triamcinolone topical ; Status:   Prescribed ; Ordered As Mnemonic:   triamcinolone 0.1% topical cream ; Simple Display Line:   1 gabino, TOP, TID, to left palm--, 30 g, 0 Refill(s) ; Ordering Provider:   Emilie Lassiter; Catalog Code:   triamcinolone topical ; Order Dt/Tm:   1/8/2021 2:12:23 PM CST          levothyroxine  :   levothyroxine ; Status:   Prescribed ; Ordered As Mnemonic:   Synthroid 75 mcg (0.075 mg) oral tablet ; Simple Display Line:   1 tab(s), Oral, qam, 90 tab(s), 3 Refill(s) ; Ordering Provider:   Emilie Lassiter; Catalog Code:   levothyroxine ; Order Dt/Tm:   7/25/2020 4:07:11 PM CDT

## 2022-02-16 NOTE — PROGRESS NOTES
Patient:   DUSTIN EDDY            MRN: [id number]            FIN: [id number]               Age:   57 years     Sex:  Female     :  1960   Associated Diagnoses:   None   Author:   Lucio ROA, Janes      Procedure   EKG procedure   Indication: preop.     Position: supine.     EKG findings   Interpretation: by primary care provider.     Rhythm: sinus.     Axis: normal axis, normal configuration.     Within normal limits.     Intervals: SD normal, QRS normal, QT normal.     Normal EKG.     P waves: normal.     QRS complex: normal, no Q waves present.     ST-T-U complex: normal.     Interpretation: normal EKG, no ST-T wave abnormalities.     Discussed: with patient.        Impression and Plan   Orders

## 2022-02-16 NOTE — NURSING NOTE
Comprehensive Intake Entered On:  7/30/2019 8:58 AM CDT    Performed On:  7/30/2019 8:46 AM CDT by Mercedes Lux               Summary   Chief Complaint :   Pt here for annual physical, also c/o joint paint- needs synthroid and triamcinolone cream refilled.   Advance Directive :   No   Weight Measured :   139.6 lb(Converted to: 139 lb 10 oz, 63.32 kg)    Height Measured :   59 in(Converted to: 4 ft 11 in, 149.86 cm)    Body Mass Index :   28.19 kg/m2 (HI)    Body Surface Area :   1.62 m2   Systolic Blood Pressure :   116 mmHg   Diastolic Blood Pressure :   78 mmHg   Mean Arterial Pressure :   91 mmHg   Peripheral Pulse Rate :   64 bpm   BP Site :   Right arm   Pulse Site :   Radial artery   BP Method :   Manual   HR Method :   Manual   Temperature Tympanic :   97.6 DegF(Converted to: 36.4 DegC)  (LOW)    Languages :   English   Mercedes Lux - 7/30/2019 8:46 AM CDT   Health Status   Allergies Verified? :   Yes   Medication History Verified? :   Yes   Medical History Verified? :   Yes   Pre-Visit Planning Status :   Completed   Tobacco Use? :   Never smoker   Mercedes Lux - 7/30/2019 8:46 AM CDT   Consents   Consent for Immunization Exchange :   Consent Granted   Consent for Immunizations to Providers :   Consent Granted   Mercedes Lux - 7/30/2019 8:46 AM CDT   Meds / Allergies   (As Of: 7/30/2019 8:58:32 AM CDT)   Allergies (Active)   No Known Medication Allergies  Estimated Onset Date:   Unspecified ; Created By:   Penny Lutz CMA; Reaction Status:   Active ; Category:   Drug ; Substance:   No Known Medication Allergies ; Type:   Allergy ; Updated By:   Penny Lutz CMA; Reviewed Date:   7/30/2019 8:56 AM CDT        Medication List   (As Of: 7/30/2019 8:58:32 AM CDT)   Prescription/Discharge Order    Synthroid 75 mcg (0.075 mg) oral tablet  :   Synthroid 75 mcg (0.075 mg) oral tablet ; Status:   Prescribed ; Ordered As Mnemonic:   Synthroid 75 mcg (0.075 mg) oral tablet ; Simple Display  Line:   See Instructions, TAKE ONE TABLET BY MOUTH EVERY MORNING AS DIRECTED, 90 unknown unit, 3 Refill(s) ; Ordering Provider:   Emilie Lassiter; Catalog Code:   levothyroxine ; Order Dt/Tm:   7/30/2018 11:23:14 AM

## 2022-02-16 NOTE — TELEPHONE ENCOUNTER
---------------------  From: Jamie Montalvo MD   To: Emilie Lassiter;     Sent: 8/4/2021 8:51:39 AM CDT  Subject: RE: General Message     I do not think parabasal endometrial cells on the pap require any attention or followup. I would simply follow as any other woman in her age group.  Jonah      ---------------------  From: Emilie Lassiter   To: Jamie Montalvo MD; Darling Marte CMA;     Sent: 8/4/2021 8:23:12 AM CDT  Subject: General Message     Dr Montalvo, Could you please tell me if the presence of endometrial cells in a woman over 45 years old on a pap is reason enough to repeat the pap any sooner? My pap gabino and Up to Date are letting me down.  Thanks so much--Emilie---------------------  From: Emilie Lassiter   To: Jamie Montalvo MD;     Sent: 8/4/2021 9:21:17 AM CDT  Subject: RE: General Message     thanks so much!

## 2022-02-16 NOTE — PROGRESS NOTES
Chief Complaint    Wood tick stuck in right side of abd.  History of Present Illness      Chief complaint as above reviewed and confirmed with patient.  Pt presents to the clinic with concerns re: tick bite to the R low back.  not certain how long present.  denies any fevers, HA, muscle aches.  no nausea, vomiting.  no rash.  Review of Systems      Review of systems is negative with the exception of those noted in HPI          Physical Exam   Vitals & Measurements    T: 98.2   F (Tympanic)  HR: 71(Peripheral)  BP: 112/76     HT: 59 in  WT: 136 lb  BMI: 27.47       exam of the R low back reveals tick in place.  it is moderately engorged deer tick.       it was easily removed with foreceps.  wound cleaned and abx ointment placed with bandage       there is a 6-7 mm  area of central purpura with surrounding erythema of about 2 cm consistant with local reaction.  Assessment/Plan       Tick bite with subsequent removal of tick (W57.XXXA)         removal as above.   given doxycycline prophylactic dose 200 mg single dose for lyme disease prophylaxis.  PI given and discussed.  fu prn.         Orders:          24208 unlisted px skin muc membrane +subq tissue (Charge), Quantity: 1, Tick bite with subsequent removal of tick          41120 office outpatient visit 10 minutes (Charge), Quantity: 1, Tick bite with subsequent removal of tick                Orders:         doxycycline, See Instructions, Instructions: 2 po single dose may take with food to minimize abdominal discomfort, # 2 cap(s), 0 Refill(s), Type: Maintenance, Pharmacy: [facility], 2 po single dose; may take with food to minimize abdominal discomfort, (Ordered)  Patient Information     Name:NUNU DUSTIN TALAVERA      Address:      [address] [place] [id number]     Sex:Female     YOB: 1960     [phone]     Emergency Contact:JOSH EDDY     MRN:[id number]     FIN:[id number]     Location:[facility]      Date of Service:05/30/2018      Primary Care Physician:       NONE ,       Attending Physician:       Jimbo HAM, Zena MAJOR, [phone]  Problem List/Past Medical History    Ongoing     Atypical squamous cells of undetermined significance (ASCUS) on Papanicolaou smear of cervix     Hypothyroidism     Knee pain     Osteoarthritis     Osteopenia     Rectal mass    Historical     Inpatient stay       Comments: @[facility], [place] - Primary osteoarthritis of right knee     Pregnancy     Pregnancy     Pregnancy  Procedure/Surgical History     Arthroplasty of knee (01/31/2018)             Comments: Right     Flexible sigmoidoscopy (06/27/2016)             Comments[facility]<br/>Findings: &nbsp; no polyps seen<br/>Recommendation: &nbsp; f/u flex sig in 1yr; f/u colonoscopy due June 2018     Colonoscopy (06/22/2015)             Comments: Indication: Previous adenomatous polyps.<br/>Sedation: Fentanyl 0.1 and Midazolam 2 mg IV.<br/>Recommendation: Repeat in 3 years. &nbsp; Flexible sigmoidoscopy in 1 &nbsp;year to inspect transanal excision site.     Colonoscopy (06/02/2014)             Comments: Sedation: midazolam, fentanyl<br/>Indication: screening<br/>30mm Tubulovillous adenoma, consistent with &quot;advance adenoma&quot;, no high grade dysplasia<br/>Referred to colorectal associates     Colonoscopy Nicola (10/22/2002)             Comments: normal     DEXA needs repeat 2018        Medications     triamcinolone 0.1% topical cream: 1 gabino, TOP, TID, to left palm, 30 g, 0 Refill(s).     Synthroid 75 mcg (0.075 mg) oral tablet: 75 mcg, 1 tab(s), po, daily, 90 tab(s), 3 Refill(s).     doxycycline monohydrate 100 mg oral capsule: See Instructions, 2 po single dose  may take with food to minimize abdominal discomfort, 2 cap(s), 0 Refill(s).          Allergies    No Known Medication Allergies  Social History    Smoking Status - 05/30/2018     Never smoker     Alcohol       4 times per week, 1 glass of wine with dinner., 08/12/2016     Employment and Education      Employed, Work/School description: ., 05/07/2013     Exercise and Physical Activity      Exercise frequency: 5-6 times/week. Exercise type: Bicycling, eliptical, rowing machine., 07/24/2017     Home and Environment      Marital status: . Spouse/Partner name: Humberto. Lives with Spouse. 3 children., 05/07/2013     Nutrition and Health      Type of diet: Regular., 05/09/2014     Sexual      Sexually active: Yes. Sexual orientation: Heterosexual., 05/09/2014     Substance Abuse      Never, 07/24/2017     Tobacco      Never, 06/25/2012  Family History    Allergy: Mother.    Breast cancer: Mother, Aunt (M) and Grandmother (P).    CAD - Coronary artery disease: Father.    COPD: Mother and Father.    High blood pressure: Mother.    Osteoporosis: Mother.    Brother: History is negative    Brother: History is negative  Immunizations      Vaccine Date Status      zoster vaccine, inactivated 04/17/2018 Given      pneumococcal (PCV13) 03/07/2018 Given      influenza virus vaccine, inactivated 01/10/2018 Given      influenza virus vaccine, inactivated 01/10/2017 Given      influenza virus vaccine, inactivated 10/06/2015 Given      ZOS, shingles 07/16/2015 Given      influenza virus vaccine, inactivated 10/11/2014 Recorded      tetanus/diphth/pertuss (Tdap) adult/adol 05/18/2011 Recorded      tetanus/diphth/pertuss (Tdap) adult/adol 05/08/2011 Recorded      influenza, H1N1, inactivated 11/12/2009 Recorded

## 2022-02-16 NOTE — PROGRESS NOTES
Patient:   DUSTIN EDDY            MRN: [id number]            FIN: [id number]               Age:   59 years     Sex:  Female     :  1960   Associated Diagnoses:   Well adult; Hypothyroidism; Joint pain   Author:   Emilie Lassiter      Chief Complaint   2019 8:46 AM CDT    Pt here for annual physical, also c/o joint paint- needs synthroid and triamcinolone cream refilled.        Well Adult History   Well Adult History             The patient presents for well adult exam, works in [employer]  osteopenia--calcium thru diet and vit D and wt bearing exercise  has mammo scheduled  very concerned about joint pain left knee but also bilat hips. Did have right knee replacement 18 months ago and it helped, but any exercise or activity bothers her left hip and knee. WOuld like to start with lyme test rather than xray  needs biometric testing, is fasting  needs TSH.  The general health status is good.  The patient's diet is described as balanced.  Exercise: routine.  Associated symptoms consist of none.  Last menstrual period: post menopausal, same parnter, pap utd.  Medical encounters:.  Additional pertinent history: tobacco use none.        Review of Systems   Constitutional:  Negative except as documented in history of present illness.    Eye:  Negative except as documented in history of present illness.    Respiratory:  Negative except as documented in history of present illness.    Cardiovascular:  Negative except as documented in history of present illness.    Breast:  Negative.    Gastrointestinal:  Negative except as documented in history of present illness.    Genitourinary:  Negative except as documented in history of present illness.    Gynecologic:  Negative except as documented in history of present illness.    Hematology/Lymphatics:  Negative except as documented in history of present illness.    Endocrine:  Negative except as documented in history of present illness.    Immunologic:  Negative  except as documented in history of present illness.    Musculoskeletal:  Negative except as documented in history of present illness.    Integumentary:  Negative except as documented in history of present illness.    Neurologic:  Negative except as documented in history of present illness.    Psychiatric:  Negative except as documented in history of present illness.              Health Status   Allergies:    Allergic Reactions (Selected)  No Known Medication Allergies   Medications:  (Selected)   Prescriptions  Prescribed  Synthroid 75 mcg (0.075 mg) oral tablet: See Instructions, Instructions: TAKE ONE TABLET BY MOUTH EVERY MORNING AS DIRECTED, # 90 unknown unit, 3 Refill(s), JESÚS, Type: Soft Stop, Pharmacy: [facility]  triamcinolone 0.1% topical cream: 1 gabino, TOP, TID, Instructions: to left palm--do not fill till she calls, # 30 g, 0 Refill(s), Type: Maintenance, Pharmacy: [facility], 1 gabino Topical tid,Instr:to left palm--do not fill till she calls   Problem list:    All Problems  Acute lyme disease / SNOMED CT 4748417408 / Confirmed  Atypical squamous cells of undetermined significance (ASCUS) on Papanicolaou smear of cervix / SNOMED CT 9628741559 / Confirmed  Hypothyroidism / SNOMED CT 662308855 / Confirmed  Osteoarthritis / SNOMED CT 6207803899 / Confirmed  Osteopenia / SNOMED CT 532464781 / Confirmed  Resolved: Inpatient stay / SNOMED CT 718864674  @[place], [place] - Primary osteoarthritis of right knee  Resolved: Knee pain / SNOMED CT 26643958  Resolved: Pregnancy / SNOMED CT 462230048  Resolved: Pregnancy / SNOMED CT 970483083  Resolved: Pregnancy / SNOMED CT 185238266  Resolved: Rectal mass / SNOMED CT 426091168      Histories   Past Medical History:    Active  Atypical squamous cells of undetermined significance (ASCUS) on Papanicolaou smear of cervix (2281508558): Onset on 4/19/2010 at 50 years.  Hypothyroidism (664893703)  Osteopenia (856319843)  Osteoarthritis  (5443985265)  Resolved  Inpatient stay (177685237): Onset on 2018 at 57 years.  Resolved on 2018 at 57 years.  Comments:  2018 CST 7:53 AM JOSE MARIA - Gloria Almonte  @[facility], [place] - Primary osteoarthritis of right knee  Knee pain (46710058):  Resolved.  Rectal mass (695584708):  Resolved.  Pregnancy (095435469):  Resolved in the month of 1985 at 25 years.  Pregnancy (590288578):  Resolved in the month of 1987 at 27 years.  Pregnancy (806816444):  Resolved in the month of 1992 at 32 years.   Family History:    Brother    History is negative.  Brother    History is negative.  Grandmother (P)  Breast cancer  Aunt (M)  Breast cancer  Mother  COPD  Allergy  Breast cancer  Osteoporosis  High blood pressure  Father:  ()  Age at death unknown.   CAD - Coronary artery disease  COPD     Procedure history:    Colonoscopy (SNOMED CT 103696391) on 2018 at 58 Years.  Comments:  2018 9:27 AM CDT - Mercedes Lux  Repeat 5 years- [facility]  Arthroplasty of knee (SNOMED CT 44097109) performed by Lorraine on 2018 at 57 Years.  Comments:  2018 7:55 AM CST - Gloria Almonte  Right  Eye surgery (SNOMED CT 7538815635) on 2017 at 57 Years.  Flexible sigmoidoscopy (SNOMED CT 49704730) on 2016 at 56 Years.  Comments:  2016 6:45 PM CDT - Darlene Berger MA  [facility]  Findings:   no polyps seen  Recommendation:   f/u flex sig in 1yr; f/u colonoscopy due 2018  Colonoscopy (ICD-9-CM 45.23) performed by Abigail Brooks MD on 2015 at 55 Years.  Comments:  2016 12:54 PM CST - Nevin Matute  Indication: Previous adenomatous polyps.  Sedation: Fentanyl 0.1 and Midazolam 2 mg IV.  Recommendation: Repeat in 3 years.   Flexible sigmoidoscopy in 1  year to inspect transanal excision site.  Colonoscopy (SNOMED CT 644583048) performed by Hang Calderón MD on 2014 at 54 Years.  Comments:  2014 3:44 PM DISHAT - Mya BREWSTER,  "Alyson  Sedation: midazolam, fentanyl  Indication: screening  30mm Tubulovillous adenoma, consistent with \"advance adenoma\", no high grade dysplasia  Referred to colorectal associates  Colonoscopy Nicola on 10/22/2002 at 42 Years.  Comments:  5/7/2013 11:19 AM CDT - Stephanie Lopez MA  normal  DEXA needs repeat 2018.   Social History:        Alcohol Assessment            4 times per week, 1 glass of wine with dinner.      Tobacco Assessment            Never      Substance Abuse Assessment            Never      Employment and Education Assessment            Unemployed.   Work/School description:.  [employer] Previous employment/school:.  University degree(s)               Highest education level:.      Home and Environment Assessment            Marital status: .  Spouse/Partner name: Humberto.  Lives with Spouse.  3 children.      Nutrition and Health Assessment            Type of diet: Regular.      Exercise and Physical Activity Assessment            Exercise frequency: 5-6 times/week.  Exercise type: Bicycling, eliptical, rowing machine.      Sexual Assessment            Sexually active: Yes.  Sexual orientation: Heterosexual.      Physical Examination   Vital Signs   7/30/2019 8:46 AM CDT Temperature Tympanic 97.6 DegF  LOW    Peripheral Pulse Rate 64 bpm    Pulse Site Radial artery    HR Method Manual    Systolic Blood Pressure 116 mmHg    Diastolic Blood Pressure 78 mmHg    Mean Arterial Pressure 91 mmHg    BP Site Right arm    BP Method Manual      Measurements from flowsheet : Measurements   7/30/2019 8:46 AM CDT Height Measured - Standard 59 in    Weight Measured - Standard 139.6 lb    BSA 1.62 m2    Body Mass Index 28.19 kg/m2  HI      General:  Alert and oriented, No acute distress, vital signs stable, as noted above.    Eye:  Pupils are equal, round and reactive to light, Extraocular movements are intact, Normal conjunctiva.    HENT:  Normocephalic, Tympanic membranes are clear, Normal " hearing, Oral mucosa is moist, No pharyngeal erythema.    Neck:  Supple, Non-tender, No lymphadenopathy, No thyromegaly.    Respiratory:  Lungs are clear to auscultation, Respirations are non-labored, Breath sounds are equal, Symmetrical chest wall expansion.    Cardiovascular:  Normal rate, Regular rhythm, No murmur, No edema.    Breast:  No mass, No tenderness, No discharge, Breasts examined .  No infra nor supraclavicular nodes palpable.  No axillary nodes or masses palpable.  No nipple discharge. Breasts normal throughout.    Gastrointestinal:  Soft, Non-tender, Non-distended, No organomegaly.    Musculoskeletal:  Normal range of motion, Normal strength, No deformity, Normal gait.    Integumentary:  Warm, Dry, Pink, Intact, No rash.    Neurologic:  Alert, Oriented, Normal sensory, Normal motor function, Cranial Nerves II-XII are grossly intact.    Psychiatric:  Cooperative, Appropriate mood & affect, Normal judgment, PHQ 9/CAGE questionaire reviewed and discussed with patient,  see score.       Impression and Plan   Diagnosis     Well adult (EVK06-BR Z00.00).     Hypothyroidism (GFA76-RD E06.3).     Joint pain (QFB18-AP M25.50).     Patient Instructions:       Counseled: Patient, Regarding diagnosis, Regarding medications, Verbalized understanding, counseled on health benefits of healthy weight, regular exercise, healthy diet, rtc for knee/hip xrays if labs negative.    Orders     Orders (Selected)   Outpatient Orders  Ordered (Dispatched)  Hemoglobin A1c* ([facility]): Specimen Type: Blood, Collection Date: 07/30/19 9:27:00 CDT  Lipid panel with reflex to direct ldl* ([facility]): Specimen Type: Serum, Collection Date: 07/30/19 9:27:00 CDT  Lyme disease antibody, total, eia with reflex to western blot (igg,igm)* ([facility]): Specimen Type: Serum, Collection Date: 07/30/19 9:27:00 CDT  Sed rate by modified westergren* ([facility]): Specimen Type: Blood, Collection Date: 07/30/19 9:27:00 CDT  TSH* ([facility]): Specimen Type: Serum,  Collection Date: 07/30/19 9:27:00 CDT.

## 2022-02-16 NOTE — PROGRESS NOTES
Patient:   DUSTIN EDDY            MRN: [id number]            FIN: [id number]               Age:   57 years     Sex:  Female     :  1960   Associated Diagnoses:   Pain in right wrist   Author:   Tay Vasquez PA-C      Chief Complaint   2017 4:09 PM CDT    Pt here for R wrist injury DOI 04.23.17 tingling feeling      History of Present Illness   Chief complaint and symptoms noted above and confirmed with patient       Health Status   Allergies:    Allergic Reactions (Selected)  No Known Medication Allergies   Medications:  (Selected)   Prescriptions  Prescribed  Synthroid 75 mcg (0.075 mg) oral tablet: 1 tab(s) ( 75 mcg ), PO, Daily, # 90 tab(s), 3 Refill(s), JESÚS, Type: Maintenance, Pharmacy: [id number], 1 tab(s) po daily   Problem list:    All Problems (Selected)  Hypothyroidism / SNOMED CT 077365848 / Confirmed  Rectal mass / SNOMED CT 282258616 / Confirmed  Osteopenia / SNOMED CT 212850227 / Confirmed  Knee pain / SNOMED CT 49873135 / Confirmed  ASCUS on PAP Smear / ICD-9-.01 / Confirmed      Histories   Past Medical History:    Active  ASCUS on PAP Smear (795.01): Onset on 2010 at 50 years.  Hypothyroidism (458617335)  Knee pain (97578719)  Osteopenia (662523262)  Resolved  Pregnancy (932261669):  Resolved in the month of 1985 at 25 years.  Pregnancy (956866899):  Resolved in the month of 1987 at 27 years.  Pregnancy (476540277):  Resolved in the month of 1992 at 32 years.   Family History:    Breast cancer  Mother  Grandmother (P)  Aunt (M)  Osteoporosis  Mother  COPD  Mother  Father ()  High blood pressure  Mother  Allergy  Mother  CAD - Coronary artery disease  Father ()     Procedure history:    Flexible sigmoidoscopy (83672336) on 2016 at 56 Years.  Comments:  2016 6:45 PM - Ab TORRES, Darlene  [facility]  Findings:   no polyps seen  Recommendation:   f/u flex sig in 1yr; f/u colonoscopy due   "2018  Colonoscopy (45.23) on 6/22/2015 at 55 Years.  Comments:  1/20/2016 12:54 PM - Nevin Matute  Indication: Previous adenomatous polyps.  Sedation: Fentanyl 0.1 and Midazolam 2 mg IV.  Recommendation: Repeat in 3 years.   Flexible sigmoidoscopy in 1  year to inspect transanal excision site.  Colonoscopy (813654656) on 6/2/2014 at 54 Years.  Comments:  6/6/2014 3:44 PM - Alyson Roque RN  Sedation: midazolam, fentanyl  Indication: screening  30mm Tubulovillous adenoma, consistent with \"advance adenoma\", no high grade dysplasia  Referred to colorectal associates  Colonoscopy [facility] on 10/22/2002 at 42 Years.  Comments:  5/7/2013 11:19 AM - Stephanie Lopez MA  normal  DEXA needs repeat 2018.  Vaginal delivery X 3.      Physical Examination   Vital Signs   4/28/2017 4:09 PM CDT Temperature Tympanic 98.1 DegF    Peripheral Pulse Rate 62 bpm    Pulse Site Radial artery    HR Method Manual    Systolic Blood Pressure 126 mmHg    Diastolic Blood Pressure 70 mmHg    Mean Arterial Pressure 89 mmHg    BP Site Right arm    BP Method Manual      Measurements from flowsheet : Measurements   4/28/2017 4:09 PM CDT Height Measured - Standard 59 in    Weight Measured - Standard 144.6 lb    BSA 1.65 m2    Body Mass Index 29.2 kg/m2      General:  No acute distress.    Musculoskeletal:  no swelling or deformation of right hand, good capillary refill in fingertips, good sensation, good  strength but it causes pain, good resisited suppination and pronation but it also causes pain, good flexion and extension, positive  snuffbox tenderness and tenderness over radial styloidl.       Review / Management   Radiology results   Results reviewed with patient.  Xray shows no fractures or dislocations.  Will be over-read by radiologist.  Will call if over-read has additional information.      Impression and Plan   Diagnosis     Pain in right wrist (NVT52-OJ M25.531).     Summary:  given a wrist splint to wear for the next 2 weeks, " use ibuprofen; if tenderness or pain persists after 2 weeks, return for re-xray.    Orders     Orders   Requests (Radiology):  XR Wrist Complete w/ Navicular Right (Request) (Order): Pain in right wrist.     Orders   Charges (Evaluation and Management):  14746 office outpatient visit 15 minutes (Charge) (Order): Quantity: 1, Pain in right wrist  Charges:   whfo w/o joints pre ots (Charge) (Order): Quantity: 1, Pain in right wrist.

## 2022-02-16 NOTE — TELEPHONE ENCOUNTER
---------------------  From: Carmita Cartagena RN (Phone Messages Pool ([id number]_[facility]))   To: DUSTIN EDDY    Sent: 8/7/2019 9:34:52 AM CDT  Subject: RE: Appointment for knee     Good Morning,    Emilie Orellana is out of clinic until Monday 8-12-19. Would you like me to forward your message on to her to address then or would you like to schedule an appointment with another provider to address sooner. Let us know what you would like to do.    Thanks,    Carmita JEAN BAPTISTE RN      ---------------------  From: DUSTIN EDDY  To: [facility]  Sent: 08/07/2019 08:53 a.m. CDT  Subject: Appointment for knee  I am still having issues with my knee and walking. I m wondering if I should schedule an appointment with NILSA Mcarthur, or if she would refer me to [facility]. Please let me know what I should do regarding this.  Thank you.

## 2022-02-16 NOTE — TELEPHONE ENCOUNTER
---------------------  From: Winter Pino LPN (Phone Messages Pool ([id number]_[facility]))   To: [facility] Message Pool ([id number]_[facility]); DUSTIN EDDY    Sent: 8/7/2019 1:57:29 PM CDT  Subject: CONSUMER MESSAGE FW: Appointment for knee     Lawson Mulligan,    Your message will be forwarded for Emilie to address on Monday.     Thanks,  Winter STALLWORTH LPN        ---------------------  From: DUSTIN EDDY  To: [facility]  Sent: 08/07/2019 01:22 p.m. CDT  Subject: RE: Appointment for knee  Could you just ask her her opinion? I would wait for her to get back, if necessary, but if she thinks I should see Dr. Siddiqi, I will make the appointment.  ---------------------  From: Carmita Cartagena RN (Phone Messages Pool ([id number]_[facility]))  To: DUSTIN EDDY  Sent: 8/7/2019 9:34:52 AM CDT  Subject: RE: Appointment for knee       Good Morning,       Emilie Orellana is out of clinic until Monday 8-12-19. Would you like me to forward your message on to her to address then or would you like to schedule an appointment with another provider to address sooner. Let us know what you would like to do.       Thanks,       Carmita JEAN BAPTISTE RN            ---------------------  From: DUSTIN EDDY  To: [facility]  Sent: 08/07/2019 08:53 a.m. CDT  Subject: Appointment for knee  I am still having issues with my knee and walking. I m wondering if I should schedule an appointment with NILSA Mcarthur, or if she would refer me to [facility]. Please let me know what I should do regarding this.  Thank you.---------------------  From: Mercedes Lux ([facility] Message Pool ([id number]_[facility]))   To: Emilie Lassiter;     Sent: 8/9/2019 1:05:46 PM CDT  Subject: FW: CONSUMER MESSAGE FW: Appointment for knee---------------------  From: Emilie Lassiter   To: [facility] Message Pool ([id number]_[facility]);     Sent: 8/12/2019 7:02:44 AM CDT  Subject: RE: CONSUMER MESSAGE FW: Appointment for knee      Please let her know I think a referral is appropriate to Dr Peters , but I think he will want an xray to look at , she didn't want an xray the day I saw her. Please put in an order for left knee xray with weight bearing view for left knee pain and referral for ortho, thanks.6:10pm Called and spoke with pt. She has already scheduled an appt with Dr Peters for 8/21/19. Pt would prefer to hold off on xrays at this time as she feels he will probably order his own xrays.---------------------  From: Catherine Aguilar LPN ([facility] Message Pool ([id number]_[facility]))   To: Emilie Lassiter;     Sent: 8/12/2019 6:13:28 PM CDT  Subject: FW: CONSUMER MESSAGE FW: Appointment for kneenoted

## 2022-03-02 NOTE — TELEPHONE ENCOUNTER
"---------------------  From: Loretta Reyes RN (Phone Messages Pool ([id number]_[facility]))   To: [facility] Message Pool ([id number]_[facility]);     Sent: 1/11/2022 10:22:51 AM CST  Subject: bleeding post BM       PCP:  IVAN      Time of Call:  9:48am       Person Calling:  pt  Phone number:  [phone]    Returned call at: 10:15am    Note:  VM received, requesting return call.   TC with pt, stating she has had colonoscopies in the past and has non cancerous polyps. Has had bleeding with BMs. Today, pt was wiping after BM and red blood was noted on the toilet paper. Pt stated her  is retiring in April, so her insurance will change, pt inquiring if she should have another colonoscopy before insurance changes?    Please advise.     Last office visit and reason:  7/14/21 well adult NCB---------------------  From: Catherine Aguilar LPN ([facility] Message Pool ([id number]_[facility]))   To: Emilie Lassiter;     Sent: 1/11/2022 10:25:15 AM CST  Subject: FW: bleeding post BM---------------------  From: Emilie Lassiter   To: [facility] Message Pool ([id number]_[facility]);     Sent: 1/11/2022 11:31:31 AM CST  Subject: RE: bleeding post BM     probably should have a visit, virtual would be [id number] Spoke with patient, she is going to reach out to her \"colon specialist\" for recommendations.  "

## 2022-04-29 ENCOUNTER — ALLIED HEALTH/NURSE VISIT (OUTPATIENT)
Dept: FAMILY MEDICINE | Facility: CLINIC | Age: 62
End: 2022-04-29
Payer: COMMERCIAL

## 2022-04-29 DIAGNOSIS — Z23 NEED FOR VACCINATION: Primary | ICD-10-CM

## 2022-04-29 PROCEDURE — 0064A COVID-19,PF,MODERNA (18+ YRS BOOSTER .25ML): CPT | Performed by: NURSE PRACTITIONER

## 2022-04-29 PROCEDURE — 91306 COVID-19,PF,MODERNA (18+ YRS BOOSTER .25ML): CPT | Performed by: NURSE PRACTITIONER

## 2022-04-29 PROCEDURE — 99207 PR NO CHARGE NURSE ONLY: CPT | Performed by: NURSE PRACTITIONER

## 2022-06-09 ENCOUNTER — TELEPHONE (OUTPATIENT)
Dept: FAMILY MEDICINE | Facility: CLINIC | Age: 62
End: 2022-06-09
Payer: COMMERCIAL

## 2022-06-09 DIAGNOSIS — F41.8 SITUATIONAL ANXIETY: Primary | ICD-10-CM

## 2022-06-09 RX ORDER — ALPRAZOLAM 1 MG
TABLET ORAL
Qty: 1 TABLET | Refills: 0 | Status: SHIPPED | OUTPATIENT
Start: 2022-06-09 | End: 2023-07-24

## 2022-06-09 NOTE — TELEPHONE ENCOUNTER
Reason for Call:  Other prescription    Detailed comments: Patient has a dentist appointment on 6.28th and she is having a procedure done.  Patient would like NCB to prescribe something that would ease the anxiety when she goes.  Last time she had a dental appointment, she was prescribed something that worked very well but cannot remember the name of it.    Patients pharmacy is [facility]    Phone Number Patient can be reached at: Home number on file [phone] (home)    Best Time: anytime    Can we leave a detailed message on this number? YES    Call taken on 6/9/2022 at 8:48 AM by JUDY SOTO

## 2022-06-09 NOTE — TELEPHONE ENCOUNTER
Please review previous note, patient requesting medication for pre-dental work.    [facility] Med List:  Alprazolam 1mg  5/14/20  #2  R-0

## 2022-07-08 ENCOUNTER — DOCUMENTATION ONLY (OUTPATIENT)
Dept: LAB | Facility: CLINIC | Age: 62
End: 2022-07-08

## 2022-07-08 DIAGNOSIS — Z13.220 LIPID SCREENING: ICD-10-CM

## 2022-07-08 DIAGNOSIS — E06.3 AUTOIMMUNE THYROIDITIS: Primary | ICD-10-CM

## 2022-07-08 DIAGNOSIS — E03.9 HYPOTHYROIDISM, UNSPECIFIED TYPE: Primary | ICD-10-CM

## 2022-07-08 DIAGNOSIS — Z13.1 SCREENING FOR DIABETES MELLITUS: ICD-10-CM

## 2022-07-08 RX ORDER — LEVOTHYROXINE SODIUM 75 UG/1
TABLET ORAL
Qty: 30 TABLET | Refills: 0 | Status: SHIPPED | OUTPATIENT
Start: 2022-07-08 | End: 2022-07-13

## 2022-07-08 NOTE — TELEPHONE ENCOUNTER
Spoke with patient only has 5 tabs left. Advised 30 day will be sent to pharmacy. Patient has labs on 7/12 and office visit on 7/19. Patient verbalized understanding.     Medication is being filled for 1 time refill only due to:  Patient needs to be seen:     Last Written Prescription Date: 7/15/2021   Last Fill Quantity: 90,  # refills: 3  Last office visit: 7/14/2021   Future Office Visit: None

## 2022-07-12 ENCOUNTER — LAB (OUTPATIENT)
Dept: LAB | Facility: CLINIC | Age: 62
End: 2022-07-12
Payer: COMMERCIAL

## 2022-07-12 DIAGNOSIS — Z13.1 SCREENING FOR DIABETES MELLITUS: ICD-10-CM

## 2022-07-12 DIAGNOSIS — Z13.220 LIPID SCREENING: ICD-10-CM

## 2022-07-12 DIAGNOSIS — E03.9 HYPOTHYROIDISM, UNSPECIFIED TYPE: ICD-10-CM

## 2022-07-12 LAB
CHOLEST SERPL-MCNC: 205 MG/DL
FASTING STATUS PATIENT QL REPORTED: YES
GLUCOSE SERPL-MCNC: 103 MG/DL (ref 70–99)
HDLC SERPL-MCNC: 52 MG/DL
LDLC SERPL CALC-MCNC: 112 MG/DL
NONHDLC SERPL-MCNC: 153 MG/DL
T4 FREE SERPL-MCNC: 1.02 NG/DL (ref 0.9–1.7)
TRIGL SERPL-MCNC: 205 MG/DL
TSH SERPL DL<=0.005 MIU/L-ACNC: 4.82 UIU/ML (ref 0.3–4.2)

## 2022-07-12 PROCEDURE — 84439 ASSAY OF FREE THYROXINE: CPT

## 2022-07-12 PROCEDURE — 36415 COLL VENOUS BLD VENIPUNCTURE: CPT

## 2022-07-12 PROCEDURE — 80061 LIPID PANEL: CPT

## 2022-07-12 PROCEDURE — 84443 ASSAY THYROID STIM HORMONE: CPT

## 2022-07-12 PROCEDURE — 82947 ASSAY GLUCOSE BLOOD QUANT: CPT | Mod: QW

## 2022-07-13 DIAGNOSIS — E06.3 AUTOIMMUNE THYROIDITIS: ICD-10-CM

## 2022-07-13 RX ORDER — LEVOTHYROXINE SODIUM 75 UG/1
75 TABLET ORAL EVERY MORNING
Qty: 90 TABLET | Refills: 4 | Status: SHIPPED | OUTPATIENT
Start: 2022-07-13 | End: 2022-07-19

## 2022-07-19 ENCOUNTER — OFFICE VISIT (OUTPATIENT)
Dept: FAMILY MEDICINE | Facility: CLINIC | Age: 62
End: 2022-07-19
Payer: COMMERCIAL

## 2022-07-19 VITALS
BODY MASS INDEX: 28.91 KG/M2 | HEART RATE: 84 BPM | HEIGHT: 59 IN | WEIGHT: 143.4 LBS | TEMPERATURE: 97.7 F | SYSTOLIC BLOOD PRESSURE: 102 MMHG | DIASTOLIC BLOOD PRESSURE: 70 MMHG | OXYGEN SATURATION: 95 %

## 2022-07-19 DIAGNOSIS — Z11.4 SCREENING FOR HIV (HUMAN IMMUNODEFICIENCY VIRUS): ICD-10-CM

## 2022-07-19 DIAGNOSIS — Z12.4 CERVICAL CANCER SCREENING: ICD-10-CM

## 2022-07-19 DIAGNOSIS — R87.618 BENIGN-APPEARING ENDOMETRIAL CELLS ON CERVICAL PAP SMEAR: ICD-10-CM

## 2022-07-19 DIAGNOSIS — R35.0 URINARY FREQUENCY: ICD-10-CM

## 2022-07-19 DIAGNOSIS — Z11.59 NEED FOR HEPATITIS C SCREENING TEST: ICD-10-CM

## 2022-07-19 DIAGNOSIS — Z80.3 FAMILY HISTORY OF MALIGNANT NEOPLASM OF BREAST: Primary | ICD-10-CM

## 2022-07-19 DIAGNOSIS — E06.3 AUTOIMMUNE THYROIDITIS: ICD-10-CM

## 2022-07-19 DIAGNOSIS — Z00.00 ROUTINE GENERAL MEDICAL EXAMINATION AT A HEALTH CARE FACILITY: ICD-10-CM

## 2022-07-19 DIAGNOSIS — E03.9 HYPOTHYROIDISM, UNSPECIFIED TYPE: ICD-10-CM

## 2022-07-19 PROBLEM — M19.90 OSTEOARTHRITIS: Status: ACTIVE | Noted: 2022-07-19

## 2022-07-19 PROBLEM — M85.80 OSTEOPENIA: Status: ACTIVE | Noted: 2022-07-19

## 2022-07-19 PROBLEM — Z86.0100 HISTORY OF COLONIC POLYPS: Status: ACTIVE | Noted: 2022-07-19

## 2022-07-19 LAB
ALBUMIN UR-MCNC: NEGATIVE MG/DL
APPEARANCE UR: CLEAR
BACTERIA #/AREA URNS HPF: ABNORMAL /HPF
BILIRUB UR QL STRIP: NEGATIVE
COLOR UR AUTO: YELLOW
GLUCOSE UR STRIP-MCNC: NEGATIVE MG/DL
HGB UR QL STRIP: ABNORMAL
KETONES UR STRIP-MCNC: NEGATIVE MG/DL
LEUKOCYTE ESTERASE UR QL STRIP: NEGATIVE
NITRATE UR QL: NEGATIVE
PH UR STRIP: 6 [PH] (ref 5–7)
RBC #/AREA URNS AUTO: ABNORMAL /HPF
SP GR UR STRIP: 1.01 (ref 1–1.03)
SQUAMOUS #/AREA URNS AUTO: ABNORMAL /LPF
UROBILINOGEN UR STRIP-ACNC: 0.2 E.U./DL
WBC #/AREA URNS AUTO: ABNORMAL /HPF

## 2022-07-19 PROCEDURE — 81001 URINALYSIS AUTO W/SCOPE: CPT | Performed by: NURSE PRACTITIONER

## 2022-07-19 PROCEDURE — 99396 PREV VISIT EST AGE 40-64: CPT | Performed by: NURSE PRACTITIONER

## 2022-07-19 PROCEDURE — 99213 OFFICE O/P EST LOW 20 MIN: CPT | Mod: 25 | Performed by: NURSE PRACTITIONER

## 2022-07-19 PROCEDURE — 87624 HPV HI-RISK TYP POOLED RSLT: CPT | Performed by: NURSE PRACTITIONER

## 2022-07-19 PROCEDURE — G0145 SCR C/V CYTO,THINLAYER,RESCR: HCPCS | Performed by: NURSE PRACTITIONER

## 2022-07-19 RX ORDER — FLAXSEED
POWDER (GRAM) ORAL DAILY
COMMUNITY

## 2022-07-19 RX ORDER — LEVOTHYROXINE SODIUM 75 UG/1
75 TABLET ORAL EVERY MORNING
Qty: 90 TABLET | Refills: 3 | Status: SHIPPED | OUTPATIENT
Start: 2022-07-19 | End: 2023-04-27

## 2022-07-19 ASSESSMENT — ENCOUNTER SYMPTOMS
DIARRHEA: 0
EYE PAIN: 0
ABDOMINAL PAIN: 0
SHORTNESS OF BREATH: 0
CHILLS: 0
NERVOUS/ANXIOUS: 0
HEMATOCHEZIA: 0
FEVER: 0
COUGH: 0
JOINT SWELLING: 0
HEARTBURN: 0
CONSTIPATION: 0
ARTHRALGIAS: 0
SORE THROAT: 0
HEMATURIA: 0
NAUSEA: 0
DYSURIA: 0
WEAKNESS: 0
MYALGIAS: 0
PALPITATIONS: 0
DIZZINESS: 0
FREQUENCY: 0
PARESTHESIAS: 0
HEADACHES: 0

## 2022-07-19 ASSESSMENT — PATIENT HEALTH QUESTIONNAIRE - PHQ9
10. IF YOU CHECKED OFF ANY PROBLEMS, HOW DIFFICULT HAVE THESE PROBLEMS MADE IT FOR YOU TO DO YOUR WORK, TAKE CARE OF THINGS AT HOME, OR GET ALONG WITH OTHER PEOPLE: NOT DIFFICULT AT ALL
SUM OF ALL RESPONSES TO PHQ QUESTIONS 1-9: 0
SUM OF ALL RESPONSES TO PHQ QUESTIONS 1-9: 0

## 2022-07-19 ASSESSMENT — ANXIETY QUESTIONNAIRES
4. TROUBLE RELAXING: NOT AT ALL
8. IF YOU CHECKED OFF ANY PROBLEMS, HOW DIFFICULT HAVE THESE MADE IT FOR YOU TO DO YOUR WORK, TAKE CARE OF THINGS AT HOME, OR GET ALONG WITH OTHER PEOPLE?: NOT DIFFICULT AT ALL
GAD7 TOTAL SCORE: 0
GAD7 TOTAL SCORE: 0
2. NOT BEING ABLE TO STOP OR CONTROL WORRYING: NOT AT ALL
5. BEING SO RESTLESS THAT IT IS HARD TO SIT STILL: NOT AT ALL
GAD7 TOTAL SCORE: 0
7. FEELING AFRAID AS IF SOMETHING AWFUL MIGHT HAPPEN: NOT AT ALL
3. WORRYING TOO MUCH ABOUT DIFFERENT THINGS: NOT AT ALL
1. FEELING NERVOUS, ANXIOUS, OR ON EDGE: NOT AT ALL
6. BECOMING EASILY ANNOYED OR IRRITABLE: NOT AT ALL
7. FEELING AFRAID AS IF SOMETHING AWFUL MIGHT HAPPEN: NOT AT ALL

## 2022-07-19 NOTE — PROGRESS NOTES
SUBJECTIVE:   CC: Msiti Muhammad is an 62 year old woman who presents for preventive health visit. She is needing a form completed for insurance, she already had the blood work done.    {Patient has been advised of split billing requirements and indicates understanding: Yes  Healthy Habits:     Getting at least 3 servings of Calcium per day:  Yes    Bi-annual eye exam:  Yes    Dental care twice a year:  Yes    Sleep apnea or symptoms of sleep apnea:  None    Diet:  Regular (no restrictions)    Frequency of exercise:  4-5 days/week    Duration of exercise:  15-30 minutes    Taking medications regularly:  Yes    Medication side effects:  None    PHQ-2 Total Score: 0    Additional concerns today:  No    Concerns about urinary frequency, believes she has uterine prolapse. Counseled on bladder training, fluid restriction, medication options, UA. She is opting for behavior mod at this time.She also has strong FH of breast cancer  (mom , MGM) and possible uterine or ovarian cancer. She did have benign endometrial cells on pap last year  Will repeat pap today. She has no vaginal bleeding    Hx of colon polyps, some bleeidng last NOV and saw her specialist, bleeding resolved with high fiber and flax and miralax, advised she touch base with her specialist again to make sure she has colonocopy in early 2023 as advised by specialist, sooner if bleeding recurs          Today's PHQ-2 Score:   PHQ-2 ( 1999 [employer]) 7/19/2022   Q1: Little interest or pleasure in doing things 0   Q2: Feeling down, depressed or hopeless 0   PHQ-2 Score 0   Q1: Little interest or pleasure in doing things Not at all   Q2: Feeling down, depressed or hopeless Not at all   PHQ-2 Score 0       Abuse: Current or Past (Physical, Sexual or Emotional) - No  Do you feel safe in your environment? Yes    Have you ever done Advance Care Planning? (For example, a Health Directive, POLST, or a discussion with a medical provider or your loved ones about your  wishes): Yes, patient states has an Advance Care Planning document and will bring a copy to the clinic.    Social History     Tobacco Use     Smoking status: Never Smoker     Smokeless tobacco: Never Used   Substance Use Topics     Alcohol use: Not on file     If you drink alcohol do you typically have >3 drinks per day or >7 drinks per week? No    Alcohol Use 7/19/2022   Prescreen: >3 drinks/day or >7 drinks/week? No   Prescreen: >3 drinks/day or >7 drinks/week? -   Reviewed orders with patient.  Reviewed health maintenance and updated orders accordingly - Yes      Breast Cancer Screening:normal mammo Oct 2021; strong FH breast cancer, will do yearly    FHS-7:   Breast CA Risk Assessment (FHS-7) 7/19/2022   Did any of your first-degree relatives have breast or ovarian cancer? Yes   Did any of your relatives have bilateral breast cancer? Unknown   Did any man in your family have breast cancer? No   Did any woman in your family have breast and ovarian cancer? Yes   Did any woman in your family have breast cancer before age 50 y? No   Do you have 2 or more relatives with breast and/or ovarian cancer? Yes   Do you have 2 or more relatives with breast and/or bowel cancer? Yes       offered genetic counseling, she is choosing yearly mammograms  Pertinent mammograms are reviewed under the imaging tab.    History of abnormal Pap smear: normal pap with endometrial cells last year, will repeat this years     Reviewed and updated as needed this visit by clinical staff   Tobacco  Allergies  Meds    Surg Hx             Reviewed and updated as needed this visit by Provider        Surg Hx                Review of Systems   Constitutional: Negative for chills and fever.   HENT: Negative for congestion, ear pain, hearing loss and sore throat.    Eyes: Negative for pain and visual disturbance.   Respiratory: Negative for cough and shortness of breath.    Cardiovascular: Negative for chest pain, palpitations and peripheral  "edema.   Gastrointestinal: Negative for abdominal pain, constipation, diarrhea, heartburn, hematochezia and nausea.   Genitourinary: Positive for urgency. Negative for dysuria, frequency, genital sores and hematuria.   Musculoskeletal: Negative for arthralgias, joint swelling and myalgias.   Skin: Negative for rash.   Neurological: Negative for dizziness, weakness, headaches and paresthesias.   Psychiatric/Behavioral: Negative for mood changes. The patient is not nervous/anxious.    Answers for HPI/ROS submitted by the patient on 7/19/2022  If you checked off any problems, how difficult have these problems made it for you to do your work, take care of things at home, or get along with other people?: Not difficult at all  PHQ9 TOTAL SCORE: 0  JAKE 7 TOTAL SCORE: 0           OBJECTIVE:   /70 (BP Location: Right arm, Patient Position: Sitting)   Pulse 84   Temp 97.7  F (36.5  C)   Ht 1.486 m (4' 10.5\")   Wt 65 kg (143 lb 6.4 oz)   SpO2 95%   Breastfeeding No   BMI 29.46 kg/m    Physical Exam  GENERAL: healthy, alert and no distress  EYES: Eyes grossly normal to inspection, PERRL and conjunctivae and sclerae normal  HENT: ear canals and TM's normal, nose and mouth without ulcers or lesions  NECK: no adenopathy, no asymmetry, masses, or scars and thyroid normal to palpation  RESP: lungs clear to auscultation - no rales, rhonchi or wheezes  BREAST: normal without masses, tenderness or nipple discharge and no palpable axillary masses or adenopathy  CV: regular rate and rhythm, normal S1 S2, no S3 or S4, no murmur, click or rub, no peripheral edema and peripheral pulses strong  ABDOMEN: soft, nontender, no hepatosplenomegaly, no masses and bowel sounds normal   (female): normal female external genitalia, normal urethral meatus, vaginal mucosa pink, moist, well rugated, and normal cervix/adnexa/uterus without masses or discharge; HOWEVER, os is a bit open with pink tissue visualized  MS: no gross " musculoskeletal defects noted, no edema  SKIN: no suspicious lesions or rashes  NEURO: Normal strength and tone, mentation intact and speech normal  PSYCH: mentation appears normal, affect normal/bright    Diagnostic Test Results:  Labs reviewed in Epic      ASSESSMENT/PLAN:     Problem List Items Addressed This Visit        Endocrine    Hypothyroidism    Relevant Medications    levothyroxine (SYNTHROID/LEVOTHROID) 75 MCG tablet    Autoimmune thyroiditis    Relevant Medications    levothyroxine (SYNTHROID/LEVOTHROID) 75 MCG tablet      Other Visit Diagnoses     Family history of malignant neoplasm of breast    -  Primary    Routine general medical examination at a health care facility        Relevant Orders    REVIEW OF HEALTH MAINTENANCE PROTOCOL ORDERS (Completed)    Screening for HIV (human immunodeficiency virus)        Relevant Orders    HIV Antigen Antibody Combo    Need for hepatitis C screening test        Relevant Orders    Hepatitis C Screen Reflex to HCV RNA Quant and Genotype    Urinary frequency        Relevant Orders    UA reflex to Microscopic and Culture    Ob/Gyn Referral    Cervical cancer screening        Relevant Orders    Pap screen with HPV - recommended age 30 - 65 years    Benign-appearing endometrial cells on cervical Pap smear        Relevant Orders    Ob/Gyn Referral          Patient has been advised of split billing requirements and indicates understanding: Yes    COUNSELING:  Reviewed preventive health counseling, as reflected in patient instructions       Regular exercise       Healthy diet/nutrition       Osteoporosis prevention/bone health       Colorectal Cancer Screening       Consider Hep C screening for all patients one time for ages 18-79 years       HIV screeninx in teen years, 1x in adult years, and at intervals if high risk       Advance Care Planning    Estimated body mass index is 29.46 kg/m  as calculated from the following:    Height as of this encounter: 1.486 m (4'  "10.5\").    Weight as of this encounter: 65 kg (143 lb 6.4 oz).        She reports that she has never smoked. She has never used smokeless tobacco.      Counseling Resources:  ATP IV Guidelines  Pooled Cohorts Equation Calculator  Breast Cancer Risk Calculator  BRCA-Related Cancer Risk Assessment: FHS-7 Tool  FRAX Risk Assessment  ICSI Preventive Guidelines  Dietary Guidelines for Americans, 2010  USDA's MyPlate  ASA Prophylaxis  Lung CA Screening    Emilie Orellana NP  [facility]  "

## 2022-07-22 LAB
BKR LAB AP GYN ADEQUACY: NORMAL
BKR LAB AP GYN INTERPRETATION: NORMAL
BKR LAB AP HPV REFLEX: NORMAL
BKR LAB AP PREVIOUS ABNORMAL: NORMAL
PATH REPORT.COMMENTS IMP SPEC: NORMAL
PATH REPORT.COMMENTS IMP SPEC: NORMAL
PATH REPORT.RELEVANT HX SPEC: NORMAL

## 2022-07-26 LAB
HUMAN PAPILLOMA VIRUS 16 DNA: NEGATIVE
HUMAN PAPILLOMA VIRUS 18 DNA: NEGATIVE
HUMAN PAPILLOMA VIRUS FINAL DIAGNOSIS: NORMAL
HUMAN PAPILLOMA VIRUS OTHER HR: NEGATIVE

## 2022-08-02 ENCOUNTER — OFFICE VISIT (OUTPATIENT)
Dept: FAMILY MEDICINE | Facility: CLINIC | Age: 62
End: 2022-08-02
Payer: COMMERCIAL

## 2022-08-02 VITALS
DIASTOLIC BLOOD PRESSURE: 75 MMHG | WEIGHT: 143.7 LBS | BODY MASS INDEX: 29.52 KG/M2 | HEART RATE: 83 BPM | TEMPERATURE: 98.7 F | SYSTOLIC BLOOD PRESSURE: 113 MMHG

## 2022-08-02 DIAGNOSIS — N81.10 VAGINAL PROLAPSE: ICD-10-CM

## 2022-08-02 DIAGNOSIS — Z01.818 PREOP GENERAL PHYSICAL EXAM: Primary | ICD-10-CM

## 2022-08-02 LAB
ERYTHROCYTE [DISTWIDTH] IN BLOOD BY AUTOMATED COUNT: 12.4 % (ref 10–15)
HCT VFR BLD AUTO: 45.6 % (ref 35–47)
HGB BLD-MCNC: 14.7 G/DL (ref 11.7–15.7)
MCH RBC QN AUTO: 30.2 PG (ref 26.5–33)
MCHC RBC AUTO-ENTMCNC: 32.2 G/DL (ref 31.5–36.5)
MCV RBC AUTO: 94 FL (ref 78–100)
PLATELET # BLD AUTO: 304 10E3/UL (ref 150–450)
RBC # BLD AUTO: 4.86 10E6/UL (ref 3.8–5.2)
WBC # BLD AUTO: 5.7 10E3/UL (ref 4–11)

## 2022-08-02 PROCEDURE — 93000 ELECTROCARDIOGRAM COMPLETE: CPT | Performed by: NURSE PRACTITIONER

## 2022-08-02 PROCEDURE — 99214 OFFICE O/P EST MOD 30 MIN: CPT | Performed by: NURSE PRACTITIONER

## 2022-08-02 PROCEDURE — 86803 HEPATITIS C AB TEST: CPT | Performed by: NURSE PRACTITIONER

## 2022-08-02 PROCEDURE — 85027 COMPLETE CBC AUTOMATED: CPT | Performed by: NURSE PRACTITIONER

## 2022-08-02 PROCEDURE — 36415 COLL VENOUS BLD VENIPUNCTURE: CPT | Performed by: NURSE PRACTITIONER

## 2022-08-02 PROCEDURE — 87389 HIV-1 AG W/HIV-1&-2 AB AG IA: CPT | Performed by: NURSE PRACTITIONER

## 2022-08-02 ASSESSMENT — ANXIETY QUESTIONNAIRES
1. FEELING NERVOUS, ANXIOUS, OR ON EDGE: NOT AT ALL
8. IF YOU CHECKED OFF ANY PROBLEMS, HOW DIFFICULT HAVE THESE MADE IT FOR YOU TO DO YOUR WORK, TAKE CARE OF THINGS AT HOME, OR GET ALONG WITH OTHER PEOPLE?: NOT DIFFICULT AT ALL
6. BECOMING EASILY ANNOYED OR IRRITABLE: NOT AT ALL
IF YOU CHECKED OFF ANY PROBLEMS ON THIS QUESTIONNAIRE, HOW DIFFICULT HAVE THESE PROBLEMS MADE IT FOR YOU TO DO YOUR WORK, TAKE CARE OF THINGS AT HOME, OR GET ALONG WITH OTHER PEOPLE: NOT DIFFICULT AT ALL
7. FEELING AFRAID AS IF SOMETHING AWFUL MIGHT HAPPEN: NOT AT ALL
GAD7 TOTAL SCORE: 0
2. NOT BEING ABLE TO STOP OR CONTROL WORRYING: NOT AT ALL
7. FEELING AFRAID AS IF SOMETHING AWFUL MIGHT HAPPEN: NOT AT ALL
4. TROUBLE RELAXING: NOT AT ALL
GAD7 TOTAL SCORE: 0
3. WORRYING TOO MUCH ABOUT DIFFERENT THINGS: NOT AT ALL
5. BEING SO RESTLESS THAT IT IS HARD TO SIT STILL: NOT AT ALL

## 2022-08-02 NOTE — PROGRESS NOTES
[address] [address]  Phone: [phone]  Fax: [phone]  Primary Provider: Emilie Orellana        PREOPERATIVE EVALUATION:  Today's date: 8/2/2022    Misti Muhammad is a 62 year old female who presents for a preoperative evaluation.    Surgical Information:  Surgery/Procedure: Hysterectomy   Surgery Location: [facility]   Surgeon: Dr. Phipps   Surgery Date: 8/17/22  Time of Surgery: TBD  Where patient plans to recover: At home with family  Fax number for surgical facility:     Type of Anesthesia Anticipated: General    Assessment & Plan     The proposed surgical procedure is considered INTERMEDIATE risk.    (Z01.818) Preop general physical exam  (primary encounter diagnosis)  Comment:   Plan: CBC with platelets, EKG 12-lead complete w/read        - Clinics            (N81.10) Vaginal prolapse  Comment:   Plan: CBC with platelets, EKG 12-lead complete w/read        - Clinics        RECOMMENDATION:  APPROVAL GIVEN to proceed with proposed procedure, without further diagnostic evaluation.    56}    Subjective     HPI related to upcoming procedure: vaginal prolapse    Preop Questions 8/2/2022   1. Have you ever had a heart attack or stroke? No   2. Have you ever had surgery on your heart or blood vessels, such as a stent placement, a coronary artery bypass, or surgery on an artery in your head, neck, heart, or legs? No   3. Do you have chest pain with activity? No   4. Do you have a history of  heart failure? No   5. Do you currently have a cold, bronchitis or symptoms of other infection? No   6. Do you have a cough, shortness of breath, or wheezing? No   7. Do you or anyone in your family have previous history of blood clots? No   8. Do you or does anyone in your family have a serious bleeding problem such as prolonged bleeding following surgeries or cuts? No   9. Have you ever had problems with anemia or been told to take iron pills? No    10. Have you had any abnormal blood loss such as black, tarry or bloody stools, or abnormal vaginal bleeding? No   11. Have you ever had a blood transfusion? UNKNOWN -    12. Are you willing to have a blood transfusion if it is medically needed before, during, or after your surgery? Yes   13. Have you or any of your relatives ever had problems with anesthesia? No   14. Do you have sleep apnea, excessive snoring or daytime drowsiness? No   15. Do you have any artifical heart valves or other implanted medical devices like a pacemaker, defibrillator, or continuous glucose monitor? No   16. Do you have artificial joints? YES - right knee   17. Are you allergic to latex? No       Health Care Directive:  Patient does not have a Health Care Directive or Living Will: Patient states has Advance Directive and will bring in a copy to clinic.    Preoperative Review of :   reviewed - no record of controlled substances prescribed.    Review of Systems  CONSTITUTIONAL: NEGATIVE for fever, chills, change in weight  INTEGUMENTARY/SKIN: NEGATIVE for worrisome rashes, moles or lesions  EYES: NEGATIVE for vision changes or irritation  ENT/MOUTH: NEGATIVE for ear, mouth and throat problems  RESP: NEGATIVE for significant cough or SOB  CV: NEGATIVE for chest pain, palpitations or peripheral edema  GI: NEGATIVE for nausea, abdominal pain, heartburn, or change in bowel habits  : NEGATIVE for frequency, dysuria, or hematuria  MUSCULOSKELETAL: NEGATIVE for significant arthralgias or myalgia  NEURO: NEGATIVE for weakness, dizziness or paresthesias  ENDOCRINE: NEGATIVE for temperature intolerance, skin/hair changes  HEME: NEGATIVE for bleeding problems  PSYCHIATRIC: NEGATIVE for changes in mood or affect    Patient Active Problem List    Diagnosis Date Noted     Autoimmune thyroiditis 07/19/2022     Priority: Medium     History of colonic polyps 07/19/2022     Priority: Medium     Osteoarthritis 07/19/2022     Priority: Medium      Osteopenia 07/19/2022     Priority: Medium     Situational anxiety 06/09/2022     Priority: Medium     Hypothyroidism 05/18/2011     Priority: Medium     Cervical cancer screening 04/19/2010     Priority: Medium     No further cervical cancer screening recommended.     Pt age 62  Pt needs 3 consecutive negative pap tests or 2 consecutive negative cotests within 10 years with the last one being in the last 5 years, before pap screening can be discontinued.    No history of KIMBERLEY 2 or greater found in epic.   Pap history below.    04/19/10 ASCUS Pap, Neg HR HPV   05/23/11 NIL Pap  2013, 2014, 2015 NIL Pap's  07/16/21 NIL Pap, Neg HR HPV  07/19/22 NIL Pap, Neg HR HPV Plan no further pap screening per provider        No past medical history on file.  Past Surgical History:   Procedure Laterality Date     ARTHROPLASTY KNEE  01/31/2018     COLONOSCOPY  06/25/2018     COLONOSCOPY  06/22/2015     COLONOSCOPY  06/02/2014     COLONOSCOPY  10/22/2002    [place]     DEXA - HIM SCAN      needs repeat 2018     EYE SURGERY  06/13/2017     FLEXIBLE SIGMOIDOSCOPY  06/27/2016     MAMMOGRAM - HIM SCAN  08/17/2020     Current Outpatient Medications   Medication Sig Dispense Refill     ALPRAZolam (XANAX) 1 MG tablet 1 tab 1 hour before dental procedure, must have  1 tablet 0     CALCIUM PO Take by mouth daily       Cholecalciferol (VITAMIN D-3 PO) Take by mouth daily       Flaxseed, Linseed, (FLAX SEEDS) POWD Take by mouth daily       levothyroxine (SYNTHROID/LEVOTHROID) 75 MCG tablet Take 1 tablet (75 mcg) by mouth every morning 90 tablet 3     Multiple Vitamin (MULTI-VITAMIN PO) Take 1 tablet by mouth daily         No Known Allergies     Social History     Tobacco Use     Smoking status: Never Smoker     Smokeless tobacco: Never Used   Substance Use Topics     Alcohol use: Not on file       History   Drug Use Not on file         Objective     /75 (BP Location: Right arm)   Pulse 83   Temp 98.7  F (37.1  C)   Wt 65.2 kg  (143 lb 11.2 oz)   BMI 29.52 kg/m      Physical Exam    GENERAL APPEARANCE: healthy, alert and no distress     EYES: EOMI, PERRL     HENT: ear canals and TM's normal and nose and mouth without ulcers or lesions     NECK: no adenopathy, no asymmetry, masses, or scars and thyroid normal to palpation     RESP: lungs clear to auscultation - no rales, rhonchi or wheezes     CV: regular rates and rhythm, normal S1 S2, no S3 or S4 and no murmur, click or rub     ABDOMEN:  soft, nontender, no HSM or masses and bowel sounds normal     MS: extremities normal- no gross deformities noted, no evidence of inflammation in joints, FROM in all extremities.     SKIN: no suspicious lesions or rashes     NEURO: Normal strength and tone, sensory exam grossly normal, mentation intact and speech normal     PSYCH: mentation appears normal. and affect normal/bright     LYMPHATICS: No cervical adenopathy    No results for input(s): HGB, PLT, INR, NA, POTASSIUM, CR, A1C in the last 89380 hours.     Diagnostics:  Recent Results (from the past 24 hour(s))   CBC with platelets    Collection Time: 08/02/22  9:36 AM   Result Value Ref Range    WBC Count 5.7 4.0 - 11.0 10e3/uL    RBC Count 4.86 3.80 - 5.20 10e6/uL    Hemoglobin 14.7 11.7 - 15.7 g/dL    Hematocrit 45.6 35.0 - 47.0 %    MCV 94 78 - 100 fL    MCH 30.2 26.5 - 33.0 pg    MCHC 32.2 31.5 - 36.5 g/dL    RDW 12.4 10.0 - 15.0 %    Platelet Count 304 150 - 450 10e3/uL      EKG: appears normal, NSR, unchanged from previous tracings    Revised Cardiac Risk Index (RCRI):  The patient has the following serious cardiovascular risks for perioperative complications:   - No serious cardiac risks = 0 points     RCRI Interpretation: 0 points: Class I (very low risk - 0.4% complication rate)           Signed Electronically by: Emilie Orellana NP  Copy of this evaluation report is provided to requesting physician.      Answers for HPI/ROS submitted by the patient on 8/2/2022  JAKE 7 TOTAL SCORE: 0

## 2022-08-02 NOTE — PATIENT INSTRUCTIONS
Preparing for Your Surgery  Getting started  A nurse will call you to review your health history and instructions. They will give you an arrival time based on your scheduled surgery time. Please be ready to share:    Your doctor's clinic name and phone number    Your medical, surgical and anesthesia history    A list of allergies and sensitivities    A list of medicines, including herbal treatments and over-the-counter drugs    Whether the patient has a legal guardian (ask how to send us the papers in advance)  Please tell us if you're pregnant--or if there's any chance you might be pregnant. Some surgeries may injure a fetus (unborn baby), so they require a pregnancy test. Surgeries that are safe for a fetus don't always need a test, and you can choose whether to have one.   If you have a child who's having surgery, please ask for a copy of Preparing for Your Child's Surgery.    Preparing for surgery    Within 30 days of surgery: Have a pre-op exam (sometimes called an H&P, or History and Physical). This can be done at a clinic or pre-operative center.  ? If you're having a , you may not need this exam. Talk to your care team.    At your pre-op exam, talk to your care team about all medicines you take. If you need to stop any medicines before surgery, ask when to start taking them again.  ? We do this for your safety. Many medicines can make you bleed too much during surgery. Some change how well surgery (anesthesia) drugs work.    Call your insurance company to let them know you're having surgery. (If you don't have insurance, call [phone].)    Call your clinic if there's any change in your health. This includes signs of a cold or flu (sore throat, runny nose, cough, rash, fever). It also includes a scrape or scratch near the surgery site.    If you have questions on the day of surgery, call your hospital or surgery center.  COVID testing  You may need to be tested for COVID-19 before having  surgery. If so, we will give you instructions.  Eating and drinking guidelines  For your safety: Unless your surgeon tells you otherwise, follow the guidelines below.    Eat and drink as usual until 8 hours before surgery. After that, no food or milk.    Drink clear liquids until 2 hours before surgery. These are liquids you can see through, like water, Gatorade and Propel Water. You may also have black coffee and tea (no cream or milk).    Nothing by mouth within 2 hours of surgery. This includes gum, candy and breath mints.    If you drink alcohol: Stop drinking it the night before surgery.    If your care team tells you to take medicine on the morning of surgery, it's okay to take it with a sip of water.  Preventing infection    Shower or bathe the night before and morning of your surgery. Follow the instructions your clinic gave you. (If no instructions, use regular soap.)    Don't shave or clip hair near your surgery site. We'll remove the hair if needed.    Don't smoke or vape the morning of surgery. You may chew nicotine gum up to 2 hours before surgery. A nicotine patch is okay.  ? Note: Some surgeries require you to completely quit smoking and nicotine. Check with your surgeon.    Your care team will make every effort to keep you safe from infection. We will:  ? Clean our hands often with soap and water (or an alcohol-based hand rub).  ? Clean the skin at your surgery site with a special soap that kills germs.  ? Give you a special gown to keep you warm. (Cold raises the risk of infection.)  ? Wear special hair covers, masks, gowns and gloves during surgery.  ? Give antibiotic medicine, if prescribed. Not all surgeries need antibiotics.  What to bring on the day of surgery    Photo ID and insurance card    Copy of your health care directive, if you have one    Glasses and hearing aides (bring cases)  ? You can't wear contacts during surgery    Inhaler and eye drops, if you use them (tell us about these when  you arrive)    CPAP machine or breathing device, if you use them    A few personal items, if spending the night    If you have . . .  ? A pacemaker, ICD (cardiac defibrillator) or other implant: Bring the ID card.  ? An implanted stimulator: Bring the remote control.  ? A legal guardian: Bring a copy of the certified (court-stamped) guardianship papers.  Please remove any jewelry, including body piercings. Leave jewelry and other valuables at home.  If you're going home the day of surgery    You must have a responsible adult drive you home. They should stay with you overnight as well.    If you don't have someone to stay with you, and you aren't safe to go home alone, we may keep you overnight. Insurance often won't pay for this.  After surgery  If it's hard to control your pain or you need more pain medicine, please call your surgeon's office.  Questions?   If you have any questions for your care team, list them here: _________________________________________________________________________________________________________________________________________________________________________ ____________________________________ ____________________________________ ____________________________________  For informational purposes only. Not to replace the advice of your health care provider. Copyright   2003, 2019 [facility]. All rights reserved. Clinically reviewed by Nika Huerta MD. [employer] [id number] - REV 07/21.

## 2022-08-03 LAB
HCV AB SERPL QL IA: NONREACTIVE
HIV 1+2 AB+HIV1 P24 AG SERPL QL IA: NONREACTIVE

## 2022-08-12 ENCOUNTER — MYC MEDICAL ADVICE (OUTPATIENT)
Dept: FAMILY MEDICINE | Facility: CLINIC | Age: 62
End: 2022-08-12

## 2022-08-15 NOTE — TELEPHONE ENCOUNTER
Lurdes Cano 60  INPATIENT OCCUPATIONAL THERAPY  [facility] SKILLED NURSING UNIT  EVALUATION    Time:   Time In: 5440  Time Out: 0840  Timed Code Treatment Minutes: 54 Minutes  Minutes: 65    Date: 2021  Patient Name: Ayaz Roberts,   Gender: male      MRN: [id number]  : 1939  (80 y.o.)  Referring Practitioner: Jared Morin MD Attending: Dr Kurt Ca  Diagnosis: [place] 19  Additional Pertinent Hx: Pt presents to the Emergency Department for the evaluation of cough and shortness of breath. Patient reports gradual onset of symptoms over the past 5 days with significant worsening of the shortness of breath over the past 2 days. He reports unproductive cough associated with decreased sense of taste and smell and some nasal congestion over the past week. Denies any known sick contacts and has not been tested for Covid since symptom onset. He reports worsening of shortness of breath with exertion, improvement with rest.  Reports generalized weakness that occurs with activity. Pt admitted to TCU on 21 for further therapy. Restrictions/Precautions:  Restrictions/Precautions: General Precautions, Fall Risk, Isolation  Position Activity Restriction  Other position/activity restrictions:  O2 on 3L and 4L with activity    Subjective  Chart Reviewed: Yes, Orders, History and Physical, Other (comment), Previous Admission  Patient assessed for rehabilitation services?: Yes    Subjective: Rn okayed OT session. Upon arrival patent was sitting on toilet in BR. Pt was agreeable to OT session.     Pain:  Pain Assessment  Patient Currently in Pain: Denies    Social/Functional History:  Lives With: Spouse  Type of Home: House  Home Layout: One level  Home Access: Stairs to enter with rails  Entrance Stairs - Number of Steps: 3-4 steps with 1 rail  Home Equipment: (none)   Bathroom Shower/Tub: Tub/Shower unit  Bathroom Toilet: Standard  Bathroom Equipment: Grab bars in shower  Bathroom Please see my chart messages and advise.      Accessibility: Accessible    Receives Help From: Family  ADL Assistance: Independent  Homemaking Assistance: Independent  Homemaking Responsibilities: Yes  Ambulation Assistance: Independent  Transfer Assistance: Independent    Active : Yes  Mode of Transportation: Car  Occupation: Retired  Additional Comments: Pt reports being Independent with all mobility in PLOF. Pt shared homemaking with his spouse prior to admission. Pt's spouse is also hospitalized on this same unit due to COVID-19 virus at this time. VISION:WFL    HEARING:  WFL    COGNITION: Decreased Insight, Decreased Problem Solving and Decreased Safety Awareness    RANGE OF MOTION:  Right Upper Extremity: Impaired - Shoulder flexion 0-90 degrees,   Left Upper Extremity:  Impaired - shoulder flexion 0-90degrees    STRENGTH:  Bilateral Upper Extremity:  Impaired - deconditioned-grossly 4/5     SENSATION:   WFL    ADL:     EATING:Setup or clean-up assistance. Vonzella Goodpasture CARE Score: 5. ORAL HYGIENE:Supervision or touching assistance. CGA standing at sinkside. O2 stats decreased to 85% while standing. Pt required vcs to take sitting rest break. Vonzella Goodpasture CARE Score: 4. TOILETING HYGIENE:Supervision or touching assistance. Vonzella Goodpasture CARE Score: 4. SHOWERING/BATHING:Supervision or touching assistance. sponge bath sitting/standing sinkside. CARE Score: 4.     UPPER BODY DRESSING:Supervision or touching assistance. Vonzella Goodpasture CARE Score: 4. LOWER BODY DRESSING:Supervision or touching assistance. Vonzella Goodpasture CARE Score: 4. FOOTWEAR:Supervision or touching assistance   . CARE Score: 4. TOILET TRANSFER: Supervision or touching assistance. Vonzella Goodpasture CARE Score: 4. Patient on 3 L O2 via Nasal Canula  upon arrival to room. Patient O2 sats at 86%. O2 increased to 4L per O2 protocol. Upon activity on 4L patient dropping O2 at 87 %. Pt requiring rest break(s) and pursed lip breathing to recover. BALANCE:  Sitting Balance:  Stand By Assistance.     Standing Balance: Contact Guard Assistance. BED MOBILITY:  Not Tested    TRANSFERS:  Sit to Stand:  [employer], cues for hand placement. Stand to Sit: Contact Guard Assistance. FUNCTIONAL MOBILITY:  Assistive Device: 4 Wheeled Walker  Assist Level:  Contact Guard Assistance. Distance: To and from bathroom  Slow pace, Min A to manage O2 tubing      Exercise:  Pt completed B UE exs with yellow resistance band x 10 reps, x 1 set, while following a handout. fair tolerance of exs, with  RBs throughout, and min cues for tech with resistance band. Difficulty completing shoulder flexion this date. Activity Tolerance:  Patient tolerance of  treatment: good. Assessment:  Assessment: This 80year old male is s/p covid 23. Pt currently on 3 liters O2 NC at rest and 4L O2 NC with all activity. Pt requires mod vcs througuout session to take rest break and pursed lip breathing. Pt requires CGA for all LB dressing and mobility during evaluation. Pt requires skilled OT intervention to increase indep and endurance with all self cares, transfers, mobility, and IADLS to decrease fall risk and return to Indep PLOF. Pts spouse is also admitted to hospital at this time. Without skilled OT intervention pt is at increased risk hospital readmission. Performance deficits / Impairments: Decreased functional mobility , Decreased ADL status, Decreased endurance, Decreased high-level IADLs, Decreased balance, Decreased ROM  Prognosis: Good  REQUIRES OT FOLLOW UP: No    Treatment Initiated: Treatment and education initiated within context of evaluation. Evaluation time included review of current medical information, gathering information related to past medical, social and functional history, completion of standardized testing, formal and informal observation of tasks, assessment of data and development of plan of care and goals.   Treatment time included skilled education and facilitation of tasks to increase safety laundry in home environment. Long term goal 2: Pt will complete ADL routine including shower (when out of droplet +) with Mod indep while demonstrating EC techniques to increase endurance with all self cares. Following session, patient left in safe position with all fall risk precautions in place.

## 2022-08-15 NOTE — TELEPHONE ENCOUNTER
I called patient this morning and explained that the message sent last week was not directed to her and she should disregard.  She is due for colon cancer screening in 2023.  She is currently having no colon issues.  She is set up to have her hysterectomy with a bladder lift in 2 days and she feels ready for that.  I answered questions.  No further action needed.

## 2022-10-03 ENCOUNTER — HEALTH MAINTENANCE LETTER (OUTPATIENT)
Age: 62
End: 2022-10-03

## 2022-10-06 ENCOUNTER — MYC MEDICAL ADVICE (OUTPATIENT)
Dept: FAMILY MEDICINE | Facility: CLINIC | Age: 62
End: 2022-10-06

## 2022-10-06 DIAGNOSIS — Z12.11 SCREENING FOR COLON CANCER: Primary | ICD-10-CM

## 2022-10-06 NOTE — TELEPHONE ENCOUNTER
I put the order through as a screening colonoscopy.  The order will be in the chart but no one will contact her to schedule so she should call the clinic that she wants to have the procedure done at,  she should know that if she is having symptoms then she needs a different type of appointment.  Also it looks like this colonoscopy is being done before the recommended date of June 2023 so she might want to check with her insurance regarding coverage.

## 2022-10-06 NOTE — TELEPHONE ENCOUNTER
Please see my chart message.   Colonoscopy order pended; please review for accuracy and update.   Last colonoscopy 2018; repeat in 5 yrs.

## 2022-10-18 NOTE — TELEPHONE ENCOUNTER
It appears that the order for colonoscopy has been faxed and it was verified that it had been received. I don't think anything else is needed at this time.

## 2022-12-12 ENCOUNTER — TRANSFERRED RECORDS (OUTPATIENT)
Dept: HEALTH INFORMATION MANAGEMENT | Facility: CLINIC | Age: 62
End: 2022-12-12

## 2023-04-26 DIAGNOSIS — E06.3 AUTOIMMUNE THYROIDITIS: ICD-10-CM

## 2023-04-26 NOTE — TELEPHONE ENCOUNTER
Routing refill request to provider for review/approval because:  LOV 8/02/2022     Thyroid Protocol Failed     Normal TSH on file in past 12 months           NARCISA Wheat  [facility]

## 2023-04-27 RX ORDER — LEVOTHYROXINE SODIUM 75 UG/1
TABLET ORAL
Qty: 90 TABLET | Refills: 0 | Status: SHIPPED | OUTPATIENT
Start: 2023-04-27 | End: 2023-10-10

## 2023-04-27 NOTE — TELEPHONE ENCOUNTER
I sent a 90-day supply of her levothyroxine to her pharmacy.  Please call her and get her set up to see me for med check.

## 2023-04-28 ENCOUNTER — TELEPHONE (OUTPATIENT)
Dept: FAMILY MEDICINE | Facility: CLINIC | Age: 63
End: 2023-04-28
Payer: COMMERCIAL

## 2023-04-28 DIAGNOSIS — E06.3 AUTOIMMUNE THYROIDITIS: Primary | ICD-10-CM

## 2023-04-28 NOTE — TELEPHONE ENCOUNTER
Order/Referral Request    Who is requesting: patient     Orders being requested: annual lab work     Reason service is needed/diagnosis: annual physical     When are orders needed by: 07/14/2023    Has this been discussed with Provider: Yes    Does patient have a preference on a Group/Provider/Facility? no    Does patient have an appointment scheduled?: Yes: lab work on 7/17/2023 and physical on 7/24/2023    Where to send orders: Place orders within Epic    Could we send this information to you in [facility] or would you prefer to receive a phone call?:   Patient would prefer a phone call   Okay to leave a detailed message?: Yes at Cell number on file:    Telephone Information:   Mobile [phone]

## 2023-07-17 ENCOUNTER — LAB (OUTPATIENT)
Dept: LAB | Facility: CLINIC | Age: 63
End: 2023-07-17
Payer: COMMERCIAL

## 2023-07-17 DIAGNOSIS — E06.3 AUTOIMMUNE THYROIDITIS: ICD-10-CM

## 2023-07-17 LAB
T4 FREE SERPL-MCNC: 0.95 NG/DL (ref 0.9–1.7)
TSH SERPL DL<=0.005 MIU/L-ACNC: 8.96 UIU/ML (ref 0.3–4.2)

## 2023-07-17 PROCEDURE — 84439 ASSAY OF FREE THYROXINE: CPT

## 2023-07-17 PROCEDURE — 84443 ASSAY THYROID STIM HORMONE: CPT

## 2023-07-17 PROCEDURE — 36415 COLL VENOUS BLD VENIPUNCTURE: CPT

## 2023-07-24 ENCOUNTER — OFFICE VISIT (OUTPATIENT)
Dept: FAMILY MEDICINE | Facility: CLINIC | Age: 63
End: 2023-07-24
Payer: COMMERCIAL

## 2023-07-24 VITALS
TEMPERATURE: 98.1 F | HEIGHT: 59 IN | DIASTOLIC BLOOD PRESSURE: 72 MMHG | BODY MASS INDEX: 28.4 KG/M2 | HEART RATE: 75 BPM | SYSTOLIC BLOOD PRESSURE: 130 MMHG | WEIGHT: 140.9 LBS | OXYGEN SATURATION: 95 %

## 2023-07-24 DIAGNOSIS — E03.9 HYPOTHYROIDISM, UNSPECIFIED TYPE: ICD-10-CM

## 2023-07-24 DIAGNOSIS — Z13.1 SCREENING FOR DIABETES MELLITUS: ICD-10-CM

## 2023-07-24 DIAGNOSIS — N81.10 VAGINAL PROLAPSE: ICD-10-CM

## 2023-07-24 DIAGNOSIS — Z00.00 ROUTINE GENERAL MEDICAL EXAMINATION AT A HEALTH CARE FACILITY: Primary | ICD-10-CM

## 2023-07-24 DIAGNOSIS — Z13.220 LIPID SCREENING: ICD-10-CM

## 2023-07-24 PROCEDURE — 99213 OFFICE O/P EST LOW 20 MIN: CPT | Mod: 25 | Performed by: NURSE PRACTITIONER

## 2023-07-24 PROCEDURE — 99396 PREV VISIT EST AGE 40-64: CPT | Performed by: NURSE PRACTITIONER

## 2023-07-24 RX ORDER — LEVOTHYROXINE SODIUM 88 UG/1
88 TABLET ORAL DAILY
Qty: 90 TABLET | Refills: 0 | Status: SHIPPED | OUTPATIENT
Start: 2023-07-24 | End: 2023-10-10

## 2023-07-24 RX ORDER — ESTRADIOL 0.1 MG/G
CREAM VAGINAL
Qty: 42.5 G | Refills: 8 | Status: SHIPPED | OUTPATIENT
Start: 2023-07-24 | End: 2024-07-30

## 2023-07-24 RX ORDER — ESTRADIOL 0.1 MG/G
CREAM VAGINAL WEEKLY
COMMUNITY
Start: 2023-07-10 | End: 2023-07-24

## 2023-07-24 RX ORDER — ESTRADIOL 0.1 MG/G
CREAM VAGINAL WEEKLY
Qty: 42.5 G | Refills: 8 | Status: SHIPPED | OUTPATIENT
Start: 2023-07-24 | End: 2023-07-24

## 2023-07-24 ASSESSMENT — ENCOUNTER SYMPTOMS
ABDOMINAL PAIN: 0
EYE PAIN: 0
HEARTBURN: 0
DIARRHEA: 0
NERVOUS/ANXIOUS: 0
FEVER: 0
DYSURIA: 0
SHORTNESS OF BREATH: 0
HEMATOCHEZIA: 0
HEADACHES: 0
PARESTHESIAS: 0
JOINT SWELLING: 0
WEAKNESS: 0
HEMATURIA: 0
BREAST MASS: 0
CHILLS: 0
COUGH: 0
PALPITATIONS: 0
NAUSEA: 0
MYALGIAS: 0
FREQUENCY: 0
CONSTIPATION: 0
SORE THROAT: 0
ARTHRALGIAS: 1
DIZZINESS: 0

## 2023-07-24 NOTE — PROGRESS NOTES
"   SUBJECTIVE:   CC: Misti is an 63 year old who presents for preventive health visit.   Retired now, eating healthy and losing wt, trigs up, will do fasting labs with repeat TSH as TSH also up. counseled on reducing carbs. Will adjust TSH dose today and recheck labs 3 months    Hysterectomy, no paps needed, using estrogen vaginal cream.  , partner also is retired.      7/24/2023    11:47 AM   Additional Questions   Roomed by dalila kay   Accompanied by self     Healthy Habits:     Getting at least 3 servings of Calcium per day:  Yes    Bi-annual eye exam:  Yes    Dental care twice a year:  Yes    Sleep apnea or symptoms of sleep apnea:  None    Diet:  Regular (no restrictions)    Frequency of exercise:  4-5 days/week    Duration of exercise:  45-60 minutes    Taking medications regularly:  Yes    Medication side effects:  None    Additional concerns today:  No      Today's PHQ-2 Score:       7/24/2023    11:24 AM   PHQ-2 ( 1999 [employer])   Q1: Little interest or pleasure in doing things 0   Q2: Feeling down, depressed or hopeless 0   PHQ-2 Score 0   Q1: Little interest or pleasure in doing things Not at all   Q2: Feeling down, depressed or hopeless Not at all   PHQ-2 Score 0           Social History     Tobacco Use    Smoking status: Never    Smokeless tobacco: Never   Substance Use Topics    Alcohol use: Yes     Comment: \"on weekends with dinner, will have wine\"             7/24/2023    11:24 AM   Alcohol Use   Prescreen: >3 drinks/day or >7 drinks/week? No     Reviewed orders with patient.  Reviewed health maintenance and updated orders accordingly - Yes  Lab work is in process    Breast Cancer Screening:    FHS-7:       7/19/2022     7:38 AM 8/2/2022     8:22 AM 7/24/2023    11:26 AM   Breast CA Risk Assessment (FHS-7)   Did any of your first-degree relatives have breast or ovarian cancer? Yes Yes Yes   Did any of your relatives have bilateral breast cancer? Unknown No No   Did any man in your family have " breast cancer? No No No   Did any woman in your family have breast and ovarian cancer? Yes Yes Yes   Did any woman in your family have breast cancer before age 50 y? No No No   Do you have 2 or more relatives with breast and/or ovarian cancer? Yes Yes Yes   Do you have 2 or more relatives with breast and/or bowel cancer? Yes Yes Yes       Mammogram Screening: Recommended annual mammography  Pertinent mammograms are reviewed under the imaging tab.    History of abnormal Pap smear: Status post benign hysterectomy. Health Maintenance and Surgical History updated.      Latest Ref Rng & Units 7/19/2022     8:22 AM   PAP / HPV   PAP  Negative for Intraepithelial Lesion or Malignancy (NILM)    HPV 16 DNA Negative Negative    HPV 18 DNA Negative Negative    Other HR HPV Negative Negative      Reviewed and updated as needed this visit by clinical staff   Tobacco  Allergies  Meds              Reviewed and updated as needed this visit by Provider                     Review of Systems   Constitutional:  Negative for chills and fever.   HENT:  Negative for congestion, ear pain, hearing loss and sore throat.    Eyes:  Negative for pain and visual disturbance.   Respiratory:  Negative for cough and shortness of breath.    Cardiovascular:  Negative for chest pain, palpitations and peripheral edema.   Gastrointestinal:  Negative for abdominal pain, constipation, diarrhea, heartburn, hematochezia and nausea.   Breasts:  Negative for tenderness, breast mass and discharge.   Genitourinary:  Positive for urgency. Negative for dysuria, frequency, genital sores, hematuria, pelvic pain, vaginal bleeding and vaginal discharge.   Musculoskeletal:  Positive for arthralgias. Negative for joint swelling and myalgias.   Skin:  Negative for rash.   Neurological:  Negative for dizziness, weakness, headaches and paresthesias.   Psychiatric/Behavioral:  Negative for mood changes. The patient is not nervous/anxious.           OBJECTIVE:   BP  "130/72 (BP Location: Right arm, Patient Position: Sitting)   Pulse 75   Temp 98.1  F (36.7  C)   Ht 1.486 m (4' 10.5\")   Wt 63.9 kg (140 lb 14.4 oz)   LMP  (LMP Unknown)   SpO2 95%   BMI 28.95 kg/m    Physical Exam  GENERAL: healthy, alert and no distress  EYES: Eyes grossly normal to inspection, PERRL and conjunctivae and sclerae normal  HENT: ear canals and TM's normal, nose and mouth without ulcers or lesions  NECK: no adenopathy, no asymmetry, masses, or scars and thyroid normal to palpation  RESP: lungs clear to auscultation - no rales, rhonchi or wheezes  BREAST: normal without masses, tenderness or nipple discharge and no palpable axillary masses or adenopathy  CV: regular rate and rhythm, normal S1 S2, no S3 or S4, no murmur, click or rub, no peripheral edema and peripheral pulses strong  ABDOMEN: soft, nontender, no hepatosplenomegaly, no masses and bowel sounds normal  MS: no gross musculoskeletal defects noted, no edema  SKIN: no suspicious lesions or rashes  NEURO: Normal strength and tone, mentation intact and speech normal  PSYCH: mentation appears normal, affect normal/bright    Diagnostic Test Results:  Labs reviewed in Epic  No results found for this or any previous visit (from the past 24 hour(s)).    ASSESSMENT/PLAN:   Misti was seen today for physical.    Diagnoses and all orders for this visit:    Routine general medical examination at a health care facility  -     REVIEW OF HEALTH MAINTENANCE PROTOCOL ORDERS    Screening for diabetes mellitus  -     Hemoglobin A1c; Future    Lipid screening  -     Lipid panel reflex to direct LDL Fasting; Future    Hypothyroidism, unspecified type  -     levothyroxine (SYNTHROID/LEVOTHROID) 88 MCG tablet; Take 1 tablet (88 mcg) by mouth daily  -     TSH with free T4 reflex; Future    Vaginal prolapse  -     estradiol (ESTRACE) 0.1 MG/GM vaginal cream; Place vaginally once a week        Patient has been advised of split billing requirements and " indicates understanding: Yes      COUNSELING:  Reviewed preventive health counseling, as reflected in patient instructions       Regular exercise       Healthy diet/nutrition       Osteoporosis prevention/bone health       Colorectal Cancer Screening       Estrogen cream and thyroid adjustment        She reports that she has never smoked. She has never used smokeless tobacco.          Emilie Orellana NP  [facility]

## 2023-10-09 ENCOUNTER — LAB (OUTPATIENT)
Dept: LAB | Facility: CLINIC | Age: 63
End: 2023-10-09
Payer: COMMERCIAL

## 2023-10-09 DIAGNOSIS — Z13.1 SCREENING FOR DIABETES MELLITUS: ICD-10-CM

## 2023-10-09 DIAGNOSIS — Z13.220 LIPID SCREENING: ICD-10-CM

## 2023-10-09 DIAGNOSIS — E03.9 HYPOTHYROIDISM, UNSPECIFIED TYPE: ICD-10-CM

## 2023-10-09 LAB
CHOLEST SERPL-MCNC: 211 MG/DL
HBA1C MFR BLD: 6 % (ref 0–5.6)
HDLC SERPL-MCNC: 49 MG/DL
LDLC SERPL CALC-MCNC: 124 MG/DL
NONHDLC SERPL-MCNC: 162 MG/DL
TRIGL SERPL-MCNC: 190 MG/DL
TSH SERPL DL<=0.005 MIU/L-ACNC: 1.87 UIU/ML (ref 0.3–4.2)

## 2023-10-09 PROCEDURE — 83036 HEMOGLOBIN GLYCOSYLATED A1C: CPT

## 2023-10-09 PROCEDURE — 84443 ASSAY THYROID STIM HORMONE: CPT

## 2023-10-09 PROCEDURE — 80061 LIPID PANEL: CPT

## 2023-10-09 PROCEDURE — 36415 COLL VENOUS BLD VENIPUNCTURE: CPT

## 2023-10-10 RX ORDER — LEVOTHYROXINE SODIUM 88 UG/1
88 TABLET ORAL DAILY
Qty: 90 TABLET | Refills: 3 | Status: SHIPPED | OUTPATIENT
Start: 2023-10-10

## 2023-10-24 ENCOUNTER — PATIENT OUTREACH (OUTPATIENT)
Dept: GASTROENTEROLOGY | Facility: CLINIC | Age: 63
End: 2023-10-24
Payer: COMMERCIAL

## 2023-10-25 ENCOUNTER — PATIENT OUTREACH (OUTPATIENT)
Dept: GASTROENTEROLOGY | Facility: CLINIC | Age: 63
End: 2023-10-25
Payer: COMMERCIAL

## 2023-11-07 ENCOUNTER — MYC MEDICAL ADVICE (OUTPATIENT)
Dept: FAMILY MEDICINE | Facility: CLINIC | Age: 63
End: 2023-11-07
Payer: COMMERCIAL

## 2023-11-13 ENCOUNTER — TRANSFERRED RECORDS (OUTPATIENT)
Dept: HEALTH INFORMATION MANAGEMENT | Facility: CLINIC | Age: 63
End: 2023-11-13
Payer: COMMERCIAL

## 2023-11-28 ENCOUNTER — OFFICE VISIT (OUTPATIENT)
Dept: FAMILY MEDICINE | Facility: CLINIC | Age: 63
End: 2023-11-28
Payer: COMMERCIAL

## 2023-11-28 VITALS
HEIGHT: 59 IN | OXYGEN SATURATION: 94 % | HEART RATE: 73 BPM | TEMPERATURE: 97.8 F | DIASTOLIC BLOOD PRESSURE: 78 MMHG | BODY MASS INDEX: 28.52 KG/M2 | WEIGHT: 141.5 LBS | RESPIRATION RATE: 16 BRPM | SYSTOLIC BLOOD PRESSURE: 130 MMHG

## 2023-11-28 DIAGNOSIS — R73.03 PRE-DIABETES: ICD-10-CM

## 2023-11-28 DIAGNOSIS — Z01.818 PRE-OP EXAM: Primary | ICD-10-CM

## 2023-11-28 DIAGNOSIS — M25.551 HIP PAIN, RIGHT: ICD-10-CM

## 2023-11-28 LAB
ANION GAP SERPL CALCULATED.3IONS-SCNC: 11 MMOL/L (ref 7–15)
BUN SERPL-MCNC: 15 MG/DL (ref 8–23)
CALCIUM SERPL-MCNC: 10.1 MG/DL (ref 8.8–10.2)
CHLORIDE SERPL-SCNC: 103 MMOL/L (ref 98–107)
CREAT SERPL-MCNC: 0.62 MG/DL (ref 0.51–0.95)
DEPRECATED HCO3 PLAS-SCNC: 26 MMOL/L (ref 22–29)
EGFRCR SERPLBLD CKD-EPI 2021: >90 ML/MIN/1.73M2
ERYTHROCYTE [DISTWIDTH] IN BLOOD BY AUTOMATED COUNT: 12.8 % (ref 10–15)
GLUCOSE SERPL-MCNC: 99 MG/DL (ref 70–99)
HCT VFR BLD AUTO: 43.8 % (ref 35–47)
HGB BLD-MCNC: 14.2 G/DL (ref 11.7–15.7)
MCH RBC QN AUTO: 30.7 PG (ref 26.5–33)
MCHC RBC AUTO-ENTMCNC: 32.4 G/DL (ref 31.5–36.5)
MCV RBC AUTO: 95 FL (ref 78–100)
PLATELET # BLD AUTO: 379 10E3/UL (ref 150–450)
POTASSIUM SERPL-SCNC: 4.8 MMOL/L (ref 3.4–5.3)
RBC # BLD AUTO: 4.62 10E6/UL (ref 3.8–5.2)
SODIUM SERPL-SCNC: 140 MMOL/L (ref 135–145)
WBC # BLD AUTO: 7.5 10E3/UL (ref 4–11)

## 2023-11-28 PROCEDURE — 99214 OFFICE O/P EST MOD 30 MIN: CPT | Performed by: NURSE PRACTITIONER

## 2023-11-28 PROCEDURE — 36415 COLL VENOUS BLD VENIPUNCTURE: CPT | Performed by: NURSE PRACTITIONER

## 2023-11-28 PROCEDURE — 80048 BASIC METABOLIC PNL TOTAL CA: CPT | Performed by: NURSE PRACTITIONER

## 2023-11-28 PROCEDURE — 85027 COMPLETE CBC AUTOMATED: CPT | Performed by: NURSE PRACTITIONER

## 2023-11-28 RX ORDER — OXYCODONE HYDROCHLORIDE 5 MG/1
1 TABLET ORAL EVERY 4 HOURS PRN
COMMUNITY
Start: 2022-08-17 | End: 2023-11-28

## 2023-11-28 NOTE — PROGRESS NOTES
[address] [address]  Phone: [phone]  Fax: [phone]  Primary Provider: Emilie Orellana  Pre-op Performing Provider: EMILIE ORELLANA      PREOPERATIVE EVALUATION:  Today's date: 11/28/2023    Misti is a 63 year old, presenting for the following:  Pre-Op Exam        11/28/2023     8:37 AM   Additional Questions   Roomed by dalila kay   Accompanied by self     Surgical Information:  Surgery/Procedure: right total hip replacement  Surgery Location: [facility]  Surgeon: Dr Sebastián Rodriguez ([facility])  Surgery Date: 12/22/2023  Time of Surgery: TBD  Where patient plans to recover: Other: [facility] overnight  Fax number for surgical facility: [phone]    Assessment & Plan     The proposed surgical procedure is considered INTERMEDIATE risk.    (Z01.818) Pre-op exam  (primary encounter diagnosis)  Comment:   Plan:     (M25.551) Hip pain, right  Comment:   Plan:     (R73.03) Pre-diabetes  Comment:   Plan:             - No identified additional risk factors other than previously addressed    Antiplatelet or Anticoagulation Medication Instructions:   - Patient is on no antiplatelet or anticoagulation medications.    Additional Medication Instructions:  Patient is to take all scheduled medications on the day of surgery    RECOMMENDATION:  APPROVAL GIVEN to proceed with proposed procedure, without further diagnostic evaluation.            Subjective       HPI related to upcoming procedure:   Will spend the night at a surgery recovery suite    Thyroid--under good control  Pre diabetes recently identified, she will meet with Dasha Conley RD for counseling regarding dietary changes          11/28/2023     8:32 AM   Preop Questions   1. Have you ever had a heart attack or stroke? No   2. Have you ever had surgery on your heart or blood vessels, such as a stent placement, a coronary artery bypass, or surgery on an artery in your  head, neck, heart, or legs? No   3. Do you have chest pain with activity? No   4. Do you have a history of  heart failure? No   5. Do you currently have a cold, bronchitis or symptoms of other infection? No   6. Do you have a cough, shortness of breath, or wheezing? No   7. Do you or anyone in your family have previous history of blood clots? No   8. Do you or does anyone in your family have a serious bleeding problem such as prolonged bleeding following surgeries or cuts? No   9. Have you ever had problems with anemia or been told to take iron pills? No   10. Have you had any abnormal blood loss such as black, tarry or bloody stools, or abnormal vaginal bleeding? No   11. Have you ever had a blood transfusion? No   12. Are you willing to have a blood transfusion if it is medically needed before, during, or after your surgery? Yes   13. Have you or any of your relatives ever had problems with anesthesia? No   14. Do you have sleep apnea, excessive snoring or daytime drowsiness? No   15. Do you have any artifical heart valves or other implanted medical devices like a pacemaker, defibrillator, or continuous glucose monitor? No   16. Do you have artificial joints? YES - right knee   17. Are you allergic to latex? No       Health Care Directive:  Patient does not have a Health Care Directive or Living Will: Patient states has Advance Directive and will bring in a copy to clinic.    Preoperative Review of :   reviewed - no record of controlled substances prescribed.          Review of Systems  CONSTITUTIONAL: NEGATIVE for fever, chills, change in weight  INTEGUMENTARY/SKIN: NEGATIVE for worrisome rashes, moles or lesions  EYES: NEGATIVE for vision changes or irritation  ENT/MOUTH: NEGATIVE for ear, mouth and throat problems  RESP: NEGATIVE for significant cough or SOB  CV: NEGATIVE for chest pain, palpitations or peripheral edema  GI: NEGATIVE for nausea, abdominal pain, heartburn, or change in bowel habits  :  NEGATIVE for frequency, dysuria, or hematuria  MUSCULOSKELETAL: NEGATIVE for significant arthralgias or myalgia  NEURO: NEGATIVE for weakness, dizziness or paresthesias  ENDOCRINE: NEGATIVE for temperature intolerance, skin/hair changes  HEME: NEGATIVE for bleeding problems  PSYCHIATRIC: NEGATIVE for changes in mood or affect    Patient Active Problem List    Diagnosis Date Noted    Autoimmune thyroiditis 07/19/2022     Priority: Medium    History of colonic polyps 07/19/2022     Priority: Medium    Osteoarthritis 07/19/2022     Priority: Medium    Osteopenia 07/19/2022     Priority: Medium    Situational anxiety 06/09/2022     Priority: Medium    Hypothyroidism 05/18/2011     Priority: Medium    Atypical squamous cells of undetermined significance (ASCUS) on Papanicolaou smear of cervix 04/19/2010     Priority: Medium      No past medical history on file.  Past Surgical History:   Procedure Laterality Date    ARTHROPLASTY KNEE Right 01/31/2018    COLONOSCOPY N/A 06/25/2018    advised to repeat in 2023    COLONOSCOPY  06/22/2015    COLONOSCOPY  06/02/2014    COLONOSCOPY  10/22/2002    [place]    COLONOSCOPY N/A 12/2022    DEXA - HIM SCAN      needs repeat 2018    EYE SURGERY  06/13/2017    FLEXIBLE SIGMOIDOSCOPY  06/27/2016    HYSTERECTOMY, PAP NO LONGER INDICATED      LAPAROSCOPIC HYSTERECTOMY TOTAL  08/17/2022    MAMMOGRAM - HIM SCAN  08/17/2020     Current Outpatient Medications   Medication Sig Dispense Refill    CALCIUM PO Take by mouth daily      Cholecalciferol (VITAMIN D-3 PO) Take by mouth daily      estradiol (ESTRACE) 0.1 MG/GM vaginal cream 1 applicator full vaginally weekly 42.5 g 8    Flaxseed, Linseed, (FLAX SEEDS) POWD Take by mouth daily      HEMP OIL OR EXTRACT OR OTHER CBD CANNABINOID, NOT MEDICAL CANNABIS, Takes one-half gummy as needed for sleep      levothyroxine (SYNTHROID/LEVOTHROID) 88 MCG tablet Take 1 tablet (88 mcg) by mouth daily 90 tablet 3    Multiple Vitamin (MULTI-VITAMIN PO) Take  "1 tablet by mouth daily         No Known Allergies     Social History     Tobacco Use    Smoking status: Never     Passive exposure: Past    Smokeless tobacco: Never   Substance Use Topics    Alcohol use: Yes     Comment: \"on weekends with dinner, will have wine\"       History   Drug Use Not on file         Objective     /78 (BP Location: Right arm, Patient Position: Sitting)   Pulse 73   Temp 97.8  F (36.6  C)   Resp 16   Ht 1.499 m (4' 11\")   Wt 64.2 kg (141 lb 8 oz)   LMP  (LMP Unknown)   SpO2 94%   Breastfeeding No   BMI 28.58 kg/m      Physical Exam    GENERAL APPEARANCE: healthy, alert and no distress     EYES: EOMI, PERRL     HENT: ear canals and TM's normal and nose and mouth without ulcers or lesions     NECK: no adenopathy, no asymmetry, masses, or scars and thyroid normal to palpation     RESP: lungs clear to auscultation - no rales, rhonchi or wheezes     CV: regular rates and rhythm, normal S1 S2, no S3 or S4 and no murmur, click or rub     ABDOMEN:  soft, nontender, no HSM or masses and bowel sounds normal     SKIN: no suspicious lesions or rashes     NEURO: Normal strength and tone, sensory exam grossly normal, mentation intact and speech normal     PSYCH: mentation appears normal. and affect normal/bright     LYMPHATICS: No cervical adenopathy    Recent Labs   Lab Test 10/09/23  0720 08/02/22  0936   HGB  --  14.7   PLT  --  304   A1C 6.0*  --         Diagnostics:  Labs pending at this time.  Results will be reviewed when available.   No EKG required, no history of coronary heart disease, significant arrhythmia, peripheral arterial disease or other structural heart disease.    Revised Cardiac Risk Index (RCRI):  The patient has the following serious cardiovascular risks for perioperative complications:   - No serious cardiac risks = 0 points     RCRI Interpretation: 0 points: Class I (very low risk - 0.4% complication rate)         Signed Electronically by: Emilie Orellana NP  Copy of " this evaluation report is provided to requesting physician.

## 2023-12-11 ENCOUNTER — ALLIED HEALTH/NURSE VISIT (OUTPATIENT)
Dept: EDUCATION SERVICES | Facility: CLINIC | Age: 63
End: 2023-12-11
Payer: COMMERCIAL

## 2023-12-11 VITALS — HEIGHT: 59 IN | WEIGHT: 139.2 LBS | BODY MASS INDEX: 28.06 KG/M2

## 2023-12-11 DIAGNOSIS — E78.5 DYSLIPIDEMIA: ICD-10-CM

## 2023-12-11 DIAGNOSIS — E66.3 OVERWEIGHT (BMI 25.0-29.9): ICD-10-CM

## 2023-12-11 DIAGNOSIS — R73.03 PREDIABETES: Primary | ICD-10-CM

## 2023-12-11 PROCEDURE — 97802 MEDICAL NUTRITION INDIV IN: CPT | Performed by: DIETITIAN, REGISTERED

## 2023-12-11 NOTE — PATIENT INSTRUCTIONS
120 - 125 goal weight    Milk  change to unsweetened vanilla almond milk     Fruit keeping it to ~ a tennis ball at a time and try to have protein with it     Soluble fiber 10 grams/ day to help reduce LDL  Konsyl fiber supplement         Goals:  Practice healthy stress management and mindful eating - think are you physically hungry or are you bored, stressed, emotional etc, make of list of things to do besides eat.    Try to get good quality sleep with a goal of 7-8 hours per night.  Stay physically active daily.  Recommend working up to a total of 30 minutes on 5 days/ week.  Recommend a fitness tracker.     Eat in a healthy way- eliminate trans fats, limit saturated fats and added sugars; follow the plate method - picture above.  Keep a food record (MyFitnessPal, Loseit).    A meal is 3 or more food groups; make it colorful for better nutrition.    Total Carbohydrates (in grams) = Breakfast  30    Lunch  30    Supper  30    If desired snacks 15

## 2023-12-11 NOTE — PROGRESS NOTES
Medical Nutrition Therapy  Visit Type:Initial assessment and intervention    Misti Muhammad presents today for MNT and education related to prediabetes, weight management for overweight Body mass index is 28.11 kg/m ., and dyslipidemia.   She is accompanied by self.     ASSESSMENT:   Patient comments/concerns relating to nutrition: Patient would like to do what ever she can to prevent diabetes and heart disease.  Patient would like also like to work on weight control.  Patient will have an upcoming hip surgery.  Patient is typically quite active with a home gym but due to recent inability to utilize equipment well, patient has not been as active.  NUTRITION HISTORY:  Coffee with 1% milk   Oatmeal with banana, walnuts   Or Raisin bran  Orange   Cheese and meat  Apple with PB toast banana  Dinner meat, salad   Water   Weekends wine   No soda, no juice   Misses meals?  No  Eats out: 1-2 meals/per month     Previous diet education: No    Food allergies/intolerances: No known food allergies    Diet is high in: carbs  Diet is low in: fiber    EXERCISE: Once hip surgery is completed patient will return to exercise routine, patient is having a treadmill delivered today and is planning on walking on that daily throughout the winter months  SOCIO/ECONOMIC:   Lives with:     MEDICATIONS:  Current Outpatient Medications   Medication    CALCIUM PO    Cholecalciferol (VITAMIN D-3 PO)    estradiol (ESTRACE) 0.1 MG/GM vaginal cream    Flaxseed, Linseed, (FLAX SEEDS) POWD    HEMP OIL OR EXTRACT OR OTHER CBD CANNABINOID, NOT MEDICAL CANNABIS,    levothyroxine (SYNTHROID/LEVOTHROID) 88 MCG tablet    Multiple Vitamin (MULTI-VITAMIN PO)     No current facility-administered medications for this visit.       LABS:  Lab Results   Component Value Date     11/28/2023      Lab Results   Component Value Date    POTASSIUM 4.8 11/28/2023     Lab Results   Component Value Date    CHLORIDE 103 11/28/2023     Lab Results   Component  "Value Date    TOMER 10.1 11/28/2023     Lab Results   Component Value Date    CO2 26 11/28/2023     Lab Results   Component Value Date    BUN 15.0 11/28/2023     Lab Results   Component Value Date    CR 0.62 11/28/2023     Lab Results   Component Value Date    GLC 99 11/28/2023    GLC 95 07/14/2021     Lab Results   Component Value Date     10/09/2023     Direct Measure HDL   Date Value Ref Range Status   10/09/2023 49 (L) >=50 mg/dL Final   ]  GFR Estimate   Date Value Ref Range Status   11/28/2023 >90 >60 mL/min/1.73m2 Final     Lab Results   Component Value Date    CR 0.62 11/28/2023     No results found for: \"MICROALBUMIN\"    ANTHROPOMETRICS:  Vitals: Ht 1.499 m (4' 11\")   Wt 63.1 kg (139 lb 3.2 oz)   LMP  (LMP Unknown)   BMI 28.11 kg/m    Body mass index is 28.11 kg/m .      Wt Readings from Last 5 Encounters:   12/11/23 63.1 kg (139 lb 3.2 oz)   11/28/23 64.2 kg (141 lb 8 oz)   07/24/23 63.9 kg (140 lb 14.4 oz)   08/02/22 65.2 kg (143 lb 11.2 oz)   07/19/22 65 kg (143 lb 6.4 oz)   NUTRITION DIAGNOSIS: Food- and nutrition-related knowledge deficit related to therapeutic diet recommendations for prediabetes and hyperlipidemia as evidenced by BMI 28, A1c 6.0.  %     NUTRITION INTERVENTION:  Today the patient is instructed on the basic physiology of prediabetes - we discussed insulin resistance and the role it has in preventing the potential of developing type 2 diabetes mellitus in the future.  Goals to reduce insulin resistance include increasing physical activity and decreasing weight.  Pt was given instructions on the effects of a healthy lifestyle including sleep, stress, food, physical activity and how they relate overall to being healthy and having good control of blood glucose levels, lipid values, and blood pressure.     Pts weight loss goal of 7 - 10% of current body weight 14 pounds as a reasonable goal to help increase insulin sensitivity.    Healthy Eating - Education on what foods are primary " sources of carbohydrates and how intake affects BG readings, edu on carbohydrate counting, reading food labels, appropriate portion sizes, well balanced meals, diabetic plate method as a general rule.  Patient is provided with ideas to incorporate dietary recommendations into meal planning, weight loss tips and mindful eating.  Instructed on Therapeutic Lifestyle Changes (TLC) nutrition plan for heart healthy eating.  Focusing on fiber and more vegetarian based meals   High fiber diet (20 - 30gm); Soluble fiber 10+ gm/ day dietary supplement discussed as an option to aid with intake    Eat more omega 3 fats  Being Active -patient will resume exercise as able following hip surgery   education on how exercise helps with :    - lowering BG by increasing the muscles ability to take up and use glucose   - weight loss   - healthier heart (improve lipid profile)   - improve sleep, mood, energy   - decrease stress      Monitoring - A1C yearly or prn   Taking Medication -will not start on medication at this time  Written materials were provided for further references and reviewed in detail.      PATIENT'S BEHAVIOR CHANGE GOALS:   See Patient Instructions for patient stated behavior change goals. AVS was printed and given to patient at today's appointment.     MONITOR / EVALUATE:  RD will monitor/evaluate:  Blood Glucose / A1c  Lipid profile  Weight change     FOLLOW-UP:  Follow up with RD as needed.     Time spent in minutes: 45  Encounter: Individual

## 2023-12-11 NOTE — LETTER
12/11/2023         RE: Misti Muhammad  [address] [place] [address]   [place] [address]        Dear Colleague,    Thank you for referring your patient, Misti Muhammad, to the [facility]. Please see a copy of my visit note below.    Medical Nutrition Therapy  Visit Type:Initial assessment and intervention    Misti Muhammad presents today for MNT and education related to prediabetes, weight management for overweight Body mass index is 28.11 kg/m ., and dyslipidemia.   She is accompanied by self.     ASSESSMENT:   Patient comments/concerns relating to nutrition: Patient would like to do what ever she can to prevent diabetes and heart disease.  Patient would like also like to work on weight control.  Patient will have an upcoming hip surgery.  Patient is typically quite active with a home gym but due to recent inability to utilize equipment well, patient has not been as active.  NUTRITION HISTORY:  Coffee with 1% milk   Oatmeal with banana, walnuts   Or Raisin bran  Orange   Cheese and meat  Apple with PB toast banana  Dinner meat, salad   Water   Weekends wine   No soda, no juice   Misses meals?  No  Eats out: 1-2 meals/per month     Previous diet education: No    Food allergies/intolerances: No known food allergies    Diet is high in: carbs  Diet is low in: fiber    EXERCISE: Once hip surgery is completed patient will return to exercise routine, patient is having a treadmill delivered today and is planning on walking on that daily throughout the winter months  SOCIO/ECONOMIC:   Lives with:     MEDICATIONS:  Current Outpatient Medications   Medication     CALCIUM PO     Cholecalciferol (VITAMIN D-3 PO)     estradiol (ESTRACE) 0.1 MG/GM vaginal cream     Flaxseed, Linseed, (FLAX SEEDS) POWD     HEMP OIL OR EXTRACT OR OTHER CBD CANNABINOID, NOT MEDICAL CANNABIS,     levothyroxine (SYNTHROID/LEVOTHROID) 88 MCG tablet     Multiple Vitamin (MULTI-VITAMIN PO)     No current  "facility-administered medications for this visit.       LABS:  Lab Results   Component Value Date     11/28/2023      Lab Results   Component Value Date    POTASSIUM 4.8 11/28/2023     Lab Results   Component Value Date    CHLORIDE 103 11/28/2023     Lab Results   Component Value Date    TOMER 10.1 11/28/2023     Lab Results   Component Value Date    CO2 26 11/28/2023     Lab Results   Component Value Date    BUN 15.0 11/28/2023     Lab Results   Component Value Date    CR 0.62 11/28/2023     Lab Results   Component Value Date    GLC 99 11/28/2023    GLC 95 07/14/2021     Lab Results   Component Value Date     10/09/2023     Direct Measure HDL   Date Value Ref Range Status   10/09/2023 49 (L) >=50 mg/dL Final   ]  GFR Estimate   Date Value Ref Range Status   11/28/2023 >90 >60 mL/min/1.73m2 Final     Lab Results   Component Value Date    CR 0.62 11/28/2023     No results found for: \"MICROALBUMIN\"    ANTHROPOMETRICS:  Vitals: Ht 1.499 m (4' 11\")   Wt 63.1 kg (139 lb 3.2 oz)   LMP  (LMP Unknown)   BMI 28.11 kg/m    Body mass index is 28.11 kg/m .      Wt Readings from Last 5 Encounters:   12/11/23 63.1 kg (139 lb 3.2 oz)   11/28/23 64.2 kg (141 lb 8 oz)   07/24/23 63.9 kg (140 lb 14.4 oz)   08/02/22 65.2 kg (143 lb 11.2 oz)   07/19/22 65 kg (143 lb 6.4 oz)   NUTRITION DIAGNOSIS: Food- and nutrition-related knowledge deficit related to therapeutic diet recommendations for prediabetes and hyperlipidemia as evidenced by BMI 28, A1c 6.0.  %     NUTRITION INTERVENTION:  Today the patient is instructed on the basic physiology of prediabetes - we discussed insulin resistance and the role it has in preventing the potential of developing type 2 diabetes mellitus in the future.  Goals to reduce insulin resistance include increasing physical activity and decreasing weight.  Pt was given instructions on the effects of a healthy lifestyle including sleep, stress, food, physical activity and how they relate overall to " being healthy and having good control of blood glucose levels, lipid values, and blood pressure.     Pts weight loss goal of 7 - 10% of current body weight 14 pounds as a reasonable goal to help increase insulin sensitivity.    Healthy Eating - Education on what foods are primary sources of carbohydrates and how intake affects BG readings, edu on carbohydrate counting, reading food labels, appropriate portion sizes, well balanced meals, diabetic plate method as a general rule.  Patient is provided with ideas to incorporate dietary recommendations into meal planning, weight loss tips and mindful eating.  Instructed on Therapeutic Lifestyle Changes (TLC) nutrition plan for heart healthy eating.  Focusing on fiber and more vegetarian based meals   High fiber diet (20 - 30gm); Soluble fiber 10+ gm/ day dietary supplement discussed as an option to aid with intake    Eat more omega 3 fats  Being Active -patient will resume exercise as able following hip surgery   education on how exercise helps with :    - lowering BG by increasing the muscles ability to take up and use glucose   - weight loss   - healthier heart (improve lipid profile)   - improve sleep, mood, energy   - decrease stress      Monitoring - A1C yearly or prn   Taking Medication -will not start on medication at this time  Written materials were provided for further references and reviewed in detail.      PATIENT'S BEHAVIOR CHANGE GOALS:   See Patient Instructions for patient stated behavior change goals. AVS was printed and given to patient at today's appointment.     MONITOR / EVALUATE:  RD will monitor/evaluate:  Blood Glucose / A1c  Lipid profile  Weight change     FOLLOW-UP:  Follow up with RD as needed.     Time spent in minutes: 45  Encounter: Individual       Complex Repair And Ftsg Text: The defect edges were debeveled with a #15 scalpel blade.  The primary defect was closed partially with a complex linear closure.  Given the location of the defect, shape of the defect and the proximity to free margins a full thickness skin graft was deemed most appropriate to repair the remaining defect.  The graft was trimmed to fit the size of the remaining defect.  The graft was then placed in the primary defect, oriented appropriately, and sutured into place.

## 2023-12-23 ENCOUNTER — MYC MEDICAL ADVICE (OUTPATIENT)
Dept: FAMILY MEDICINE | Facility: CLINIC | Age: 63
End: 2023-12-23
Payer: COMMERCIAL

## 2024-01-04 ENCOUNTER — TRANSFERRED RECORDS (OUTPATIENT)
Dept: HEALTH INFORMATION MANAGEMENT | Facility: CLINIC | Age: 64
End: 2024-01-04
Payer: COMMERCIAL

## 2024-01-15 ENCOUNTER — TRANSFERRED RECORDS (OUTPATIENT)
Dept: HEALTH INFORMATION MANAGEMENT | Facility: CLINIC | Age: 64
End: 2024-01-15
Payer: COMMERCIAL

## 2024-03-18 ENCOUNTER — TRANSFERRED RECORDS (OUTPATIENT)
Dept: HEALTH INFORMATION MANAGEMENT | Facility: CLINIC | Age: 64
End: 2024-03-18
Payer: COMMERCIAL

## 2024-04-12 ENCOUNTER — OFFICE VISIT (OUTPATIENT)
Dept: FAMILY MEDICINE | Facility: CLINIC | Age: 64
End: 2024-04-12
Payer: COMMERCIAL

## 2024-04-12 VITALS
DIASTOLIC BLOOD PRESSURE: 76 MMHG | TEMPERATURE: 98.1 F | OXYGEN SATURATION: 96 % | HEART RATE: 79 BPM | WEIGHT: 139.1 LBS | BODY MASS INDEX: 28.04 KG/M2 | HEIGHT: 59 IN | SYSTOLIC BLOOD PRESSURE: 126 MMHG

## 2024-04-12 DIAGNOSIS — M54.42 ACUTE LEFT-SIDED LOW BACK PAIN WITH LEFT-SIDED SCIATICA: Primary | ICD-10-CM

## 2024-04-12 PROCEDURE — 99213 OFFICE O/P EST LOW 20 MIN: CPT | Performed by: NURSE PRACTITIONER

## 2024-04-12 RX ORDER — CYCLOBENZAPRINE HCL 10 MG
10 TABLET ORAL 3 TIMES DAILY PRN
Qty: 30 TABLET | Refills: 0 | Status: SHIPPED | OUTPATIENT
Start: 2024-04-12 | End: 2024-07-30

## 2024-04-12 RX ORDER — HYDROXYZINE HYDROCHLORIDE 25 MG/1
25 TABLET, FILM COATED ORAL 3 TIMES DAILY PRN
COMMUNITY
Start: 2024-04-08 | End: 2024-07-30

## 2024-04-12 RX ORDER — ESTRADIOL 0.1 MG/G
CREAM VAGINAL
Qty: 42.5 G | Refills: 8 | Status: CANCELLED | OUTPATIENT
Start: 2024-04-12

## 2024-04-12 RX ORDER — IBUPROFEN 600 MG/1
600 TABLET, FILM COATED ORAL EVERY 6 HOURS PRN
Qty: 50 TABLET | Refills: 0 | Status: SHIPPED | OUTPATIENT
Start: 2024-04-12 | End: 2024-07-30

## 2024-04-12 ASSESSMENT — ANXIETY QUESTIONNAIRES
IF YOU CHECKED OFF ANY PROBLEMS ON THIS QUESTIONNAIRE, HOW DIFFICULT HAVE THESE PROBLEMS MADE IT FOR YOU TO DO YOUR WORK, TAKE CARE OF THINGS AT HOME, OR GET ALONG WITH OTHER PEOPLE: NOT DIFFICULT AT ALL
1. FEELING NERVOUS, ANXIOUS, OR ON EDGE: NOT AT ALL
6. BECOMING EASILY ANNOYED OR IRRITABLE: NOT AT ALL
7. FEELING AFRAID AS IF SOMETHING AWFUL MIGHT HAPPEN: NOT AT ALL
GAD7 TOTAL SCORE: 0
GAD7 TOTAL SCORE: 0
3. WORRYING TOO MUCH ABOUT DIFFERENT THINGS: NOT AT ALL
5. BEING SO RESTLESS THAT IT IS HARD TO SIT STILL: NOT AT ALL
GAD7 TOTAL SCORE: 0
4. TROUBLE RELAXING: NOT AT ALL
2. NOT BEING ABLE TO STOP OR CONTROL WORRYING: NOT AT ALL
7. FEELING AFRAID AS IF SOMETHING AWFUL MIGHT HAPPEN: NOT AT ALL
8. IF YOU CHECKED OFF ANY PROBLEMS, HOW DIFFICULT HAVE THESE MADE IT FOR YOU TO DO YOUR WORK, TAKE CARE OF THINGS AT HOME, OR GET ALONG WITH OTHER PEOPLE?: NOT DIFFICULT AT ALL

## 2024-04-12 ASSESSMENT — PATIENT HEALTH QUESTIONNAIRE - PHQ9
SUM OF ALL RESPONSES TO PHQ QUESTIONS 1-9: 2
10. IF YOU CHECKED OFF ANY PROBLEMS, HOW DIFFICULT HAVE THESE PROBLEMS MADE IT FOR YOU TO DO YOUR WORK, TAKE CARE OF THINGS AT HOME, OR GET ALONG WITH OTHER PEOPLE: NOT DIFFICULT AT ALL
SUM OF ALL RESPONSES TO PHQ QUESTIONS 1-9: 2

## 2024-04-12 NOTE — PROGRESS NOTES
"  Assessment & Plan     (M54.42) Acute left-sided low back pain with left-sided sciatica  (primary encounter diagnosis)  Comment: left-sided low back pain x1 week. Pain radiates down left thigh and calf. Lying down worsens the pain and walking helps. She has been taking Tylenol with little relief. She did take Ibuprofen one night which helped.   Plan: ibuprofen (ADVIL/MOTRIN) 600 MG tablet,         cyclobenzaprine (FLEXERIL) 10 MG tablet        If no improvement will consider PT referral.            BMI  Estimated body mass index is 28.09 kg/m  as calculated from the following:    Height as of this encounter: 1.499 m (4' 11\").    Weight as of this encounter: 63.1 kg (139 lb 1.6 oz).             Subjective   Ese is a 64 year old, presenting for the following health issues: having lower back pain on left side, pain shoots down to the calf, not so bothersome when walking, worse - when lying down or sitting, noticed about one week ago   Back Pain and Leg Pain  Had her right hip replaced in 12/2023 and just started using her pilates reformer 1 week ago.     Denies numbness, tingling or spasms.       4/12/2024     2:21 PM   Additional Questions   Roomed by dalila kay   Accompanied by  - Humberto     History of Present Illness       Back Pain:  She presents for follow up of back pain. Patient's back pain is a new problem.    Original cause of back pain: not sure  First noticed back pain: in the last week  Patient feels back pain: dailyLocation of back pain:  Left lower back  Description of back pain: dull ache  Back pain spreads: left thigh and left knee    Since patient first noticed back pain, pain is: gradually worsening  Does back pain interfere with her job:  No  On a scale of 1-10 (10 being the worst), patient describes pain as:  7  What makes back pain worse: certain positions, lying down and twisting   Acupuncture: not tried  Acetaminophen: not helpful  Activity or exercise: helpful  Chiropractor:  Not " tried  Cold: not tried  Heat: not tried  Massage: not tried  Muscle relaxants: not tried  NSAIDS: helpful  Opioids: not tried  Physical Therapy: not tried  Rest: not helpful  Steroid Injection: not tried  Stretching: helpful  Surgery: not tried  TENS unit: not tried  Topical pain relievers: not tried  Other healthcare providers patient is seeing for back pain: None    She eats 2-3 servings of fruits and vegetables daily.She consumes 0 sweetened beverage(s) daily.She exercises with enough effort to increase her heart rate 30 to 60 minutes per day.  She exercises with enough effort to increase her heart rate 6 days per week.   She is taking medications regularly.     PATIENT HEALTH QUESTIONNAIRE-9 (PHQ - 9)    Over the last 2 weeks, how often have you been bothered by any of the following problems?    1. Little interest or pleasure in doing things -  Not at all   2. Feeling down, depressed, or hopeless -  Not at all   3. Trouble falling or staying asleep, or sleeping too much - Several days   4. Feeling tired or having little energy -  Not at all   5. Poor appetite or overeating -  Not at all   6. Feeling bad about yourself - or that you are a failure or have let yourself or your family down -  Not at all   7. Trouble concentrating on things, such as reading the newspaper or watching television - Not at all   8. Moving or speaking so slowly that other people could have noticed? Or the opposite - being so fidgety or restless that you have been moving around a lot more than usual Several days   9. Thoughts that you would be better off dead or of hurting  yourself in some way Not at all   Total Score: 2     If you checked off any problems, how difficult have these problems made it for you to do your work, take care of things at home, or get along with other people?      Developed by Adis Mendez, Vale Orr, Jimy Voss and colleagues, with an educational tyler from [employer] No permission required  "to reproduce, translate, display or distribute. permission required to reproduce, translate, display or distribute.     GAD7 score: 0                 Objective    /76 (BP Location: Right arm, Patient Position: Sitting)   Pulse 79   Temp 98.1  F (36.7  C)   Ht 1.499 m (4' 11\")   Wt 63.1 kg (139 lb 1.6 oz)   LMP  (LMP Unknown)   SpO2 96%   Breastfeeding No   BMI 28.09 kg/m    Body mass index is 28.09 kg/m .  Physical Exam   GENERAL: alert and no distress  MS: no gross musculoskeletal defects noted, no edema  Comprehensive back pain exam:  Tenderness of left lower back.  She is able to flex lumbar spine with little pain.  Exam on cot with full ROM both right and left hip            Signed Electronically by: Emilie Orellana NP    "

## 2024-04-15 ENCOUNTER — MYC MEDICAL ADVICE (OUTPATIENT)
Dept: FAMILY MEDICINE | Facility: CLINIC | Age: 64
End: 2024-04-15
Payer: COMMERCIAL

## 2024-07-26 SDOH — HEALTH STABILITY: PHYSICAL HEALTH: ON AVERAGE, HOW MANY DAYS PER WEEK DO YOU ENGAGE IN MODERATE TO STRENUOUS EXERCISE (LIKE A BRISK WALK)?: 6 DAYS

## 2024-07-26 SDOH — HEALTH STABILITY: PHYSICAL HEALTH: ON AVERAGE, HOW MANY MINUTES DO YOU ENGAGE IN EXERCISE AT THIS LEVEL?: 70 MIN

## 2024-07-26 ASSESSMENT — SOCIAL DETERMINANTS OF HEALTH (SDOH): HOW OFTEN DO YOU GET TOGETHER WITH FRIENDS OR RELATIVES?: TWICE A WEEK

## 2024-07-30 ENCOUNTER — OFFICE VISIT (OUTPATIENT)
Dept: FAMILY MEDICINE | Facility: CLINIC | Age: 64
End: 2024-07-30
Payer: COMMERCIAL

## 2024-07-30 VITALS
RESPIRATION RATE: 14 BRPM | DIASTOLIC BLOOD PRESSURE: 68 MMHG | HEART RATE: 76 BPM | WEIGHT: 140.4 LBS | HEIGHT: 59 IN | TEMPERATURE: 98.2 F | OXYGEN SATURATION: 97 % | SYSTOLIC BLOOD PRESSURE: 108 MMHG | BODY MASS INDEX: 28.3 KG/M2

## 2024-07-30 DIAGNOSIS — M54.42 ACUTE LEFT-SIDED LOW BACK PAIN WITH LEFT-SIDED SCIATICA: ICD-10-CM

## 2024-07-30 DIAGNOSIS — E03.9 HYPOTHYROIDISM, UNSPECIFIED TYPE: ICD-10-CM

## 2024-07-30 DIAGNOSIS — R73.03 PRE-DIABETES: Primary | ICD-10-CM

## 2024-07-30 DIAGNOSIS — Z00.00 ROUTINE GENERAL MEDICAL EXAMINATION AT A HEALTH CARE FACILITY: ICD-10-CM

## 2024-07-30 DIAGNOSIS — N81.10 VAGINAL PROLAPSE: ICD-10-CM

## 2024-07-30 DIAGNOSIS — Z13.220 LIPID SCREENING: ICD-10-CM

## 2024-07-30 LAB
CHOLEST SERPL-MCNC: 211 MG/DL
FASTING STATUS PATIENT QL REPORTED: YES
HBA1C MFR BLD: 5.7 % (ref 0–5.6)
HDLC SERPL-MCNC: 56 MG/DL
LDLC SERPL CALC-MCNC: 122 MG/DL
NONHDLC SERPL-MCNC: 155 MG/DL
TRIGL SERPL-MCNC: 165 MG/DL
TSH SERPL DL<=0.005 MIU/L-ACNC: 1.68 UIU/ML (ref 0.3–4.2)

## 2024-07-30 PROCEDURE — 36415 COLL VENOUS BLD VENIPUNCTURE: CPT | Performed by: NURSE PRACTITIONER

## 2024-07-30 PROCEDURE — 80061 LIPID PANEL: CPT | Performed by: NURSE PRACTITIONER

## 2024-07-30 PROCEDURE — 84443 ASSAY THYROID STIM HORMONE: CPT | Performed by: NURSE PRACTITIONER

## 2024-07-30 PROCEDURE — 83036 HEMOGLOBIN GLYCOSYLATED A1C: CPT | Performed by: NURSE PRACTITIONER

## 2024-07-30 PROCEDURE — 99213 OFFICE O/P EST LOW 20 MIN: CPT | Mod: 25 | Performed by: NURSE PRACTITIONER

## 2024-07-30 PROCEDURE — 99396 PREV VISIT EST AGE 40-64: CPT | Performed by: NURSE PRACTITIONER

## 2024-07-30 RX ORDER — LEVOTHYROXINE SODIUM 88 UG/1
88 TABLET ORAL DAILY
Qty: 90 TABLET | Refills: 3 | Status: CANCELLED | OUTPATIENT
Start: 2024-07-30

## 2024-07-30 RX ORDER — ESTRADIOL 0.1 MG/G
CREAM VAGINAL
Qty: 42.5 G | Refills: 8 | Status: SHIPPED | OUTPATIENT
Start: 2024-07-30

## 2024-07-30 RX ORDER — HYDROXYZINE HYDROCHLORIDE 25 MG/1
25 TABLET, FILM COATED ORAL
Qty: 30 TABLET | Refills: 3 | Status: SHIPPED | OUTPATIENT
Start: 2024-07-30

## 2024-07-30 NOTE — PATIENT INSTRUCTIONS
Patient Education   Preventive Care Advice   This is general advice given by our system to help you stay healthy. However, your care team may have specific advice just for you. Please talk to your care team about your preventive care needs.  Nutrition  Eat 5 or more servings of fruits and vegetables each day.  Try wheat bread, brown rice and whole grain pasta (instead of white bread, rice, and pasta).  Get enough calcium and vitamin D. Check the label on foods and aim for 100% of the RDA (recommended daily allowance).  Lifestyle  Exercise at least 150 minutes each week  (30 minutes a day, 5 days a week).  Do muscle strengthening activities 2 days a week. These help control your weight and prevent disease.  No smoking.  Wear sunscreen to prevent skin cancer.  Have a dental exam and cleaning every 6 months.  Yearly exams  See your health care team every year to talk about:  Any changes in your health.  Any medicines your care team has prescribed.  Preventive care, family planning, and ways to prevent chronic diseases.  Shots (vaccines)   HPV shots (up to age 26), if you've never had them before.  Hepatitis B shots (up to age 59), if you've never had them before.  COVID-19 shot: Get this shot when it's due.  Flu shot: Get a flu shot every year.  Tetanus shot: Get a tetanus shot every 10 years.  Pneumococcal, hepatitis A, and RSV shots: Ask your care team if you need these based on your risk.  Shingles shot (for age 50 and up)  General health tests  Diabetes screening:  Starting at age 35, Get screened for diabetes at least every 3 years.  If you are younger than age 35, ask your care team if you should be screened for diabetes.  Cholesterol test: At age 39, start having a cholesterol test every 5 years, or more often if advised.  Bone density scan (DEXA): At age 50, ask your care team if you should have this scan for osteoporosis (brittle bones).  Hepatitis C: Get tested at least once in your life.  STIs (sexually  transmitted infections)  Before age 24: Ask your care team if you should be screened for STIs.  After age 24: Get screened for STIs if you're at risk. You are at risk for STIs (including HIV) if:  You are sexually active with more than one person.  You don't use condoms every time.  You or a partner was diagnosed with a sexually transmitted infection.  If you are at risk for HIV, ask about PrEP medicine to prevent HIV.  Get tested for HIV at least once in your life, whether you are at risk for HIV or not.  Cancer screening tests  Cervical cancer screening: If you have a cervix, begin getting regular cervical cancer screening tests starting at age 21.  Breast cancer scan (mammogram): If you've ever had breasts, begin having regular mammograms starting at age 40. This is a scan to check for breast cancer.  Colon cancer screening: It is important to start screening for colon cancer at age 45.  Have a colonoscopy test every 10 years (or more often if you're at risk) Or, ask your provider about stool tests like a FIT test every year or Cologuard test every 3 years.  To learn more about your testing options, visit:   .  For help making a decision, visit:   https://bit.ly/xn21385.  Prostate cancer screening test: If you have a prostate, ask your care team if a prostate cancer screening test (PSA) at age 55 is right for you.  Lung cancer screening: If you are a current or former smoker ages 50 to 80, ask your care team if ongoing lung cancer screenings are right for you.  For informational purposes only. Not to replace the advice of your health care provider. Copyright   2023 [facility]. All rights reserved. Clinically reviewed by the [facility]. [employer] 497605 - REV 01/24.  Substance Use Disorder: Care Instructions  Overview     You can improve your life and health by stopping your use of alcohol or drugs. When you don't drink or use drugs, you may feel and sleep better. You may  get along better with your family, friends, and coworkers. There are medicines and programs that can help with substance use disorder.  How can you care for yourself at home?  Here are some ways to help you stay sober and prevent relapse.  If you have been given medicine to help keep you sober or reduce your cravings, be sure to take it exactly as prescribed.  Talk to your doctor about programs that can help you stop using drugs or drinking alcohol.  Do not keep alcohol or drugs in your home.  Plan ahead. Think about what you'll say if other people ask you to drink or use drugs. Try not to spend time with people who drink or use drugs.  Use the time and money spent on drinking or drugs to do something that's important to you.  Preventing a relapse  Have a plan to deal with relapse. Learn to recognize changes in your thinking that lead you to drink or use drugs. Get help before you start to drink or use drugs again.  Try to stay away from situations, friends, or places that may lead you to drink or use drugs.  If you feel the need to drink alcohol or use drugs again, seek help right away. Call a trusted friend or family member. Some people get support from organizations such as Narcotics Anonymous or [employer] or from treatment facilities.  If you relapse, get help as soon as you can. Some people make a plan with another person that outlines what they want that person to do for them if they relapse. The plan usually includes how to handle the relapse and who to notify in case of relapse.  Don't give up. Remember that a relapse doesn't mean that you have failed. Use the experience to learn the triggers that lead you to drink or use drugs. Then quit again. Recovery is a lifelong process. Many people have several relapses before they are able to quit for good.  Follow-up care is a key part of your treatment and safety. Be sure to make and go to all appointments, and call your doctor if you are having problems. It's  "also a good idea to know your test results and keep a list of the medicines you take.  When should you call for help?   Call 911  anytime you think you may need emergency care. For example, call if you or someone else:    Has overdosed or has withdrawal signs. Be sure to tell the emergency workers that you are or someone else is using or trying to quit using drugs. Overdose or withdrawal signs may include:  Losing consciousness.  Seizure.  Seeing or hearing things that aren't there (hallucinations).     Is thinking or talking about suicide or harming others.   Where to get help 24 hours a day, 7 days a week   If you or someone you know talks about suicide, self-harm, a mental health crisis, a substance use crisis, or any other kind of emotional distress, get help right away. You can:    Call the Suicide and Crisis Lifeline at 988.     Call 8-671-666-TALK (1-365.641.3370).     Text HOME to 023903 to access the Crisis Text Line.   Consider saving these numbers in your phone.  Go to [email or web address] for more information or to chat online.  Call your doctor now or seek immediate medical care if:    You are having withdrawal symptoms. These may include nausea or vomiting, sweating, shakiness, and anxiety.   Watch closely for changes in your health, and be sure to contact your doctor if:    You have a relapse.     You need more help or support to stop.   Where can you learn more?  Go to https://www.[employer].net/patiented  Enter H573 in the search box to learn more about \"Substance Use Disorder: Care Instructions.\"  Current as of: November 15, 2023               Content Version: 14.0    7385-5895 [employer].   Care instructions adapted under license by your healthcare professional. If you have questions about a medical condition or this instruction, always ask your healthcare professional. [employer] disclaims any warranty or liability for your use of this information.         "

## 2024-07-30 NOTE — PROGRESS NOTES
"Preventive Care Visit  [facility]  Emilie Orellana NP, Family Medicine  Jul 30, 2024      Assessment & Plan     (R73.03) Pre-diabetes  (primary encounter diagnosis)  Comment:   Plan: Hemoglobin A1c          Has changed diet, saw RD    (E03.9) Hypothyroidism, unspecified type  Comment:   Plan: TSH with free T4 reflex        Await tsh    (N81.10) Vaginal prolapse  Comment:   Plan: estradiol (ESTRACE) 0.1 MG/GM vaginal cream        Had hysterectomy, no longer needs paps. Some urinary urgency, doing exercises    (Z13.220) Lipid screening  Comment:   Plan: Lipid panel reflex to direct LDL Fasting            (M54.42) Acute left-sided low back pain with left-sided sciatica  Comment:   Plan: hydrOXYzine HCl (ATARAX) 25 MG tablet            (Z00.00) Routine general medical examination at a health care facility  Comment:   Plan:             BMI  Estimated body mass index is 28.36 kg/m  as calculated from the following:    Height as of this encounter: 1.499 m (4' 11\").    Weight as of this encounter: 63.7 kg (140 lb 6.4 oz).   Weight management plan: Discussed healthy diet and exercise guidelines    Counseling  Appropriate preventive services were addressed with this patient via screening, questionnaire, or discussion as appropriate for fall prevention, nutrition, physical activity, Tobacco-use cessation, weight loss and cognition.  Checklist reviewing preventive services available has been given to the patient.  Reviewed patient's diet, addressing concerns and/or questions.           Subjective   Ese Ayala is a 64 year old, presenting for the following: patient is here today for her annual exam  Physical        7/30/2024     8:14 AM   Additional Questions   Roomed by dalila kay   Accompanied by self        Health Care Directive  Patient does not have a Health Care Directive or Living Will: Patient states has Advance Directive and will bring in a copy to clinic.    HPI            7/26/2024   General Health "   How would you rate your overall physical health? Good   Feel stress (tense, anxious, or unable to sleep) Not at all            7/26/2024   Nutrition   Three or more servings of calcium each day? Yes   Diet: Regular (no restrictions)   How many servings of fruit and vegetables per day? (!) 2-3   How many sweetened beverages each day? 0-1            7/26/2024   Exercise   Days per week of moderate/strenous exercise 6 days   Average minutes spent exercising at this level 70 min            7/26/2024   Social Factors   Frequency of gathering with friends or relatives Twice a week   Worry food won't last until get money to buy more No   Food not last or not have enough money for food? No   Do you have housing? (Housing is defined as stable permanent housing and does not include staying ouside in a car, in a tent, in an abandoned building, in an overnight shelter, or couch-surfing.) Yes   Are you worried about losing your housing? No   Lack of transportation? No   Unable to get utilities (heat,electricity)? No            7/26/2024   Fall Risk   Fallen 2 or more times in the past year? No   Trouble with walking or balance? No             7/26/2024   Dental   Dentist two times every year? Yes            7/26/2024   TB Screening   Were you born outside of the [place]? No            Today's PHQ-2 Score:       7/30/2024     8:00 AM   PHQ-2 ( 1999 [employer])   Q1: Little interest or pleasure in doing things 0   Q2: Feeling down, depressed or hopeless 0   PHQ-2 Score 0   Q1: Little interest or pleasure in doing things Not at all   Q2: Feeling down, depressed or hopeless Not at all   PHQ-2 Score 0           7/26/2024   Substance Use   Alcohol more than 3/day or more than 7/wk No   Do you use any other substances recreationally? (!) CANNABIS PRODUCTS        Social History     Tobacco Use    Smoking status: Never     Passive exposure: Past    Smokeless tobacco: Never   Vaping Use    Vaping status: Never Used   Substance Use Topics     "Alcohol use: Yes     Comment: Glass on wine on weekends with dinner.    Drug use: Never           11/28/2023   LAST FHS-7 RESULTS   1st degree relative breast or ovarian cancer Yes   Any relative bilateral breast cancer No   Any male have breast cancer No   Any ONE woman have BOTH breast AND ovarian cancer Yes   Any woman with breast cancer before 50yrs Unknown   2 or more relatives with breast AND/OR ovarian cancer Yes   2 or more relatives with breast AND/OR bowel cancer Yes      Mom and aunt with breast cancer. Mom survivor     Mammogram Screening - Mammogram every 1-2 years updated in Health Maintenance based on mutual decision making        7/26/2024   STI Screening   New sexual partner(s) since last STI/HIV test? No        History of abnormal Pap smear: No - age 30-64 HPV with reflex Pap every 5 years recommended        Latest Ref Rng & Units 7/19/2022     8:22 AM   PAP / HPV   PAP  Negative for Intraepithelial Lesion or Malignancy (NILM)    HPV 16 DNA Negative Negative    HPV 18 DNA Negative Negative    Other HR HPV Negative Negative      ASCVD Risk   The 10-year ASCVD risk score (Raine MAY, et al., 2019) is: 3.9%    Values used to calculate the score:      Age: 64 years      Sex: Female      Is Non- : No      Diabetic: No      Tobacco smoker: No      Systolic Blood Pressure: 108 mmHg      Is BP treated: No      HDL Cholesterol: 49 mg/dL      Total Cholesterol: 211 mg/dL           Reviewed and updated as needed this visit by Provider                    Lab work is in process         Objective    Exam  /68 (BP Location: Right arm, Patient Position: Sitting)   Pulse 76   Temp 98.2  F (36.8  C)   Resp 14   Ht 1.499 m (4' 11\")   Wt 63.7 kg (140 lb 6.4 oz)   LMP  (LMP Unknown)   SpO2 97%   Breastfeeding No   BMI 28.36 kg/m     Estimated body mass index is 28.36 kg/m  as calculated from the following:    Height as of this encounter: 1.499 m (4' 11\").    Weight as of " this encounter: 63.7 kg (140 lb 6.4 oz).    Physical Exam  GENERAL: alert and no distress  EYES: Eyes grossly normal to inspection, PERRL and conjunctivae and sclerae normal  HENT: ear canals and TM's normal, nose and mouth without ulcers or lesions  NECK: no adenopathy, no asymmetry, masses, or scars  RESP: lungs clear to auscultation - no rales, rhonchi or wheezes  BREAST: normal without masses, tenderness or nipple discharge and no palpable axillary masses or adenopathy  CV: regular rate and rhythm, normal S1 S2, no S3 or S4, no murmur, click or rub, no peripheral edema  ABDOMEN: soft, nontender, no hepatosplenomegaly, no masses and bowel sounds normal  MS: no gross musculoskeletal defects noted, no edema  NEURO: Normal strength and tone, mentation intact and speech normal  PSYCH: mentation appears normal, affect normal/bright        Signed Electronically by: Emilie Orellana NP

## 2024-09-18 ENCOUNTER — MYC MEDICAL ADVICE (OUTPATIENT)
Dept: FAMILY MEDICINE | Facility: CLINIC | Age: 64
End: 2024-09-18
Payer: COMMERCIAL

## 2024-10-09 ENCOUNTER — PATIENT OUTREACH (OUTPATIENT)
Dept: CARE COORDINATION | Facility: CLINIC | Age: 64
End: 2024-10-09
Payer: COMMERCIAL

## 2024-10-09 DIAGNOSIS — E03.9 HYPOTHYROIDISM, UNSPECIFIED TYPE: ICD-10-CM

## 2024-10-09 RX ORDER — LEVOTHYROXINE SODIUM 88 UG/1
TABLET ORAL
Qty: 90 TABLET | Refills: 2 | Status: SHIPPED | OUTPATIENT
Start: 2024-10-09

## 2024-11-06 ENCOUNTER — PATIENT OUTREACH (OUTPATIENT)
Dept: CARE COORDINATION | Facility: CLINIC | Age: 64
End: 2024-11-06
Payer: COMMERCIAL

## 2024-11-14 DIAGNOSIS — F41.8 SITUATIONAL ANXIETY: ICD-10-CM

## 2024-11-15 ENCOUNTER — TELEPHONE (OUTPATIENT)
Dept: FAMILY MEDICINE | Facility: CLINIC | Age: 64
End: 2024-11-15
Payer: COMMERCIAL

## 2024-11-15 RX ORDER — ALPRAZOLAM 1 MG/1
TABLET ORAL
Qty: 1 TABLET | Refills: 0 | OUTPATIENT
Start: 2024-11-15

## 2024-11-15 NOTE — TELEPHONE ENCOUNTER
I called and LVMTCB, if/when patient calls back, please inform patient that they are due for a appointment with Emilie Orellana for further refills on ALPRAZolam (XANAX) 1 MG tablet.    Ok to double book this afternoon 11/15, and ok to be virtual.

## 2024-11-15 NOTE — TELEPHONE ENCOUNTER
Medication Question or Refill    Contacts       Contact Date/Time Type Contact Phone/Fax    11/15/2024 05:47 PM CST Phone (Incoming) Ese Muhammad Lucy STEVENS (Self) [phone] (M)            What medication are you calling about (include dose and sig)?: Anti anxiety med for going to the dentist.    Preferred Pharmacy:  [facility] [address] [address]  Phone: [phone] Fax: [phone]      Controlled Substance Agreement on file:   CSA -- Patient Level:    CSA: None found at the patient level.       Who prescribed the medication?: Esteban    Do you need a refill? Yes    When did you use the medication last? 2022    Patient offered an appointment? Yes: Agent was unable to get an appointment before Tues.    Do you have any questions or concerns?  Yes: Patient would like a call back due to this matter. She said it is one pill that she needs for her root canal on Tue. At 8am      Could we send this information to you in [facility] or would you prefer to receive a phone call?:   Patient would prefer a phone call   Okay to leave a detailed message?: Yes at Cell number on file:    Telephone Information:   Home  [phone]

## 2024-11-18 ENCOUNTER — OFFICE VISIT (OUTPATIENT)
Dept: FAMILY MEDICINE | Facility: CLINIC | Age: 64
End: 2024-11-18
Payer: COMMERCIAL

## 2024-11-18 VITALS
SYSTOLIC BLOOD PRESSURE: 114 MMHG | WEIGHT: 138.8 LBS | DIASTOLIC BLOOD PRESSURE: 60 MMHG | OXYGEN SATURATION: 96 % | RESPIRATION RATE: 16 BRPM | BODY MASS INDEX: 27.98 KG/M2 | HEIGHT: 59 IN | TEMPERATURE: 97.8 F | HEART RATE: 87 BPM

## 2024-11-18 DIAGNOSIS — K02.9 DENTAL DECAY: ICD-10-CM

## 2024-11-18 DIAGNOSIS — M85.80 OSTEOPENIA, UNSPECIFIED LOCATION: Primary | ICD-10-CM

## 2024-11-18 DIAGNOSIS — F41.8 SITUATIONAL ANXIETY: ICD-10-CM

## 2024-11-18 PROBLEM — M85.89 OSTEOPENIA OF MULTIPLE SITES: Status: ACTIVE | Noted: 2022-07-19

## 2024-11-18 PROCEDURE — 99213 OFFICE O/P EST LOW 20 MIN: CPT | Performed by: NURSE PRACTITIONER

## 2024-11-18 RX ORDER — AMOXICILLIN 500 MG/1
CAPSULE ORAL
Qty: 20 CAPSULE | Refills: 0 | Status: SHIPPED | OUTPATIENT
Start: 2024-11-18

## 2024-11-18 RX ORDER — ALPRAZOLAM 1 MG/1
TABLET ORAL
COMMUNITY
End: 2024-11-18

## 2024-11-18 RX ORDER — ALPRAZOLAM 1 MG/1
TABLET ORAL
Qty: 3 TABLET | Refills: 0 | Status: SHIPPED | OUTPATIENT
Start: 2024-11-18

## 2024-11-18 RX ORDER — AMOXICILLIN 500 MG/1
CAPSULE ORAL
COMMUNITY
Start: 2023-12-22

## 2024-11-18 ASSESSMENT — PATIENT HEALTH QUESTIONNAIRE - PHQ9
SUM OF ALL RESPONSES TO PHQ QUESTIONS 1-9: 0
SUM OF ALL RESPONSES TO PHQ QUESTIONS 1-9: 0
10. IF YOU CHECKED OFF ANY PROBLEMS, HOW DIFFICULT HAVE THESE PROBLEMS MADE IT FOR YOU TO DO YOUR WORK, TAKE CARE OF THINGS AT HOME, OR GET ALONG WITH OTHER PEOPLE: NOT DIFFICULT AT ALL

## 2024-11-18 ASSESSMENT — ANXIETY QUESTIONNAIRES
GAD7 TOTAL SCORE: 0
5. BEING SO RESTLESS THAT IT IS HARD TO SIT STILL: NOT AT ALL
6. BECOMING EASILY ANNOYED OR IRRITABLE: NOT AT ALL
3. WORRYING TOO MUCH ABOUT DIFFERENT THINGS: NOT AT ALL
8. IF YOU CHECKED OFF ANY PROBLEMS, HOW DIFFICULT HAVE THESE MADE IT FOR YOU TO DO YOUR WORK, TAKE CARE OF THINGS AT HOME, OR GET ALONG WITH OTHER PEOPLE?: NOT DIFFICULT AT ALL
1. FEELING NERVOUS, ANXIOUS, OR ON EDGE: NOT AT ALL
7. FEELING AFRAID AS IF SOMETHING AWFUL MIGHT HAPPEN: NOT AT ALL
GAD7 TOTAL SCORE: 0
GAD7 TOTAL SCORE: 0
7. FEELING AFRAID AS IF SOMETHING AWFUL MIGHT HAPPEN: NOT AT ALL
2. NOT BEING ABLE TO STOP OR CONTROL WORRYING: NOT AT ALL
4. TROUBLE RELAXING: NOT AT ALL
IF YOU CHECKED OFF ANY PROBLEMS ON THIS QUESTIONNAIRE, HOW DIFFICULT HAVE THESE PROBLEMS MADE IT FOR YOU TO DO YOUR WORK, TAKE CARE OF THINGS AT HOME, OR GET ALONG WITH OTHER PEOPLE: NOT DIFFICULT AT ALL

## 2024-11-18 ASSESSMENT — ENCOUNTER SYMPTOMS: NERVOUS/ANXIOUS: 1

## 2024-11-18 NOTE — PROGRESS NOTES
"  Assessment & Plan     (M85.80) Osteopenia, unspecified location  (primary encounter diagnosis)  Comment:   Plan: DEXA HIP/PELVIS/SPINE - Future        Hx of osteopenia    (F41.8) Situational anxiety  Comment:   Plan: needs dental work and the alprazolam is much less expensive than the laughing gas. Has tolerated well in past    (K02.9) Dental decay  Comment:   Plan: ALPRAZolam (XANAX) 1 MG tablet, amoxicillin         (AMOXIL) 500 MG capsule                        Subjective   Ese Ayala is a 64 year old, presenting for the following health issues: has an upcoming root canal scheduled - would like to get rx for Alprazolam  Recheck Medication and Anxiety        11/18/2024    12:11 PM   Additional Questions   Roomed by dalila kay   Accompanied by self     Anxiety    History of Present Illness       Reason for visit:  Prescribe medication needed for upcoming dental procedure on 11/19/2024.   She is taking medications regularly.                 Objective    /60 (BP Location: Right arm, Patient Position: Sitting)   Pulse 87   Temp 97.8  F (36.6  C)   Resp 16   Ht 1.499 m (4' 11\")   Wt 63 kg (138 lb 12.8 oz)   LMP  (LMP Unknown)   SpO2 96%   Breastfeeding No   BMI 28.03 kg/m    Body mass index is 28.03 kg/m .  Physical Exam   GENERAL: alert and no distress  MS: no gross musculoskeletal defects noted, no edema            Signed Electronically by: Emilie Orellana NP    "

## 2024-11-19 ENCOUNTER — PATIENT OUTREACH (OUTPATIENT)
Dept: CARE COORDINATION | Facility: CLINIC | Age: 64
End: 2024-11-19
Payer: COMMERCIAL

## 2024-11-21 ENCOUNTER — PATIENT OUTREACH (OUTPATIENT)
Dept: CARE COORDINATION | Facility: CLINIC | Age: 64
End: 2024-11-21
Payer: COMMERCIAL

## 2024-11-25 ENCOUNTER — E-VISIT (OUTPATIENT)
Dept: FAMILY MEDICINE | Facility: CLINIC | Age: 64
End: 2024-11-25
Payer: COMMERCIAL

## 2024-11-25 DIAGNOSIS — M85.80 OSTEOPENIA, UNSPECIFIED LOCATION: Primary | ICD-10-CM

## 2024-12-02 ENCOUNTER — TRANSFERRED RECORDS (OUTPATIENT)
Dept: MULTI SPECIALTY CLINIC | Facility: CLINIC | Age: 64
End: 2024-12-02
Payer: COMMERCIAL

## 2024-12-05 ENCOUNTER — OFFICE VISIT (OUTPATIENT)
Dept: FAMILY MEDICINE | Facility: CLINIC | Age: 64
End: 2024-12-05
Payer: COMMERCIAL

## 2024-12-05 VITALS
HEART RATE: 82 BPM | DIASTOLIC BLOOD PRESSURE: 79 MMHG | SYSTOLIC BLOOD PRESSURE: 115 MMHG | RESPIRATION RATE: 16 BRPM | TEMPERATURE: 97.7 F | OXYGEN SATURATION: 97 %

## 2024-12-05 DIAGNOSIS — M81.0 OSTEOPOROSIS, UNSPECIFIED OSTEOPOROSIS TYPE, UNSPECIFIED PATHOLOGICAL FRACTURE PRESENCE: Primary | ICD-10-CM

## 2024-12-05 LAB
ANION GAP SERPL CALCULATED.3IONS-SCNC: 9 MMOL/L (ref 7–15)
BUN SERPL-MCNC: 12.2 MG/DL (ref 8–23)
CALCIUM SERPL-MCNC: 9.7 MG/DL (ref 8.8–10.4)
CHLORIDE SERPL-SCNC: 103 MMOL/L (ref 98–107)
CREAT SERPL-MCNC: 0.71 MG/DL (ref 0.51–0.95)
EGFRCR SERPLBLD CKD-EPI 2021: >90 ML/MIN/1.73M2
EST. AVERAGE GLUCOSE BLD GHB EST-MCNC: 120 MG/DL
GLUCOSE SERPL-MCNC: 87 MG/DL (ref 70–99)
HBA1C MFR BLD: 5.8 % (ref 0–5.6)
HCO3 SERPL-SCNC: 28 MMOL/L (ref 22–29)
MAGNESIUM SERPL-MCNC: 2.5 MG/DL (ref 1.7–2.3)
PHOSPHATE SERPL-MCNC: 3.8 MG/DL (ref 2.5–4.5)
POTASSIUM SERPL-SCNC: 4.6 MMOL/L (ref 3.4–5.3)
SODIUM SERPL-SCNC: 140 MMOL/L (ref 135–145)
VIT D+METAB SERPL-MCNC: 57 NG/ML (ref 20–50)

## 2024-12-05 RX ORDER — ALENDRONATE SODIUM 70 MG/1
70 TABLET ORAL
Qty: 12 TABLET | Refills: 4 | Status: SHIPPED | OUTPATIENT
Start: 2024-12-05

## 2024-12-05 NOTE — PROGRESS NOTES
Assessment & Plan     (M81.0) Osteoporosis, unspecified osteoporosis type, unspecified pathological fracture presence  (primary encounter diagnosis)  Comment: starting Fosamax 12/24  Plan: alendronate (FOSAMAX) 70 MG tablet, Magnesium,         Basic metabolic panel  (Ca, Cl, CO2, Creat,         Gluc, K, Na, BUN), Phosphorus, Vitamin D         deficiency screening, Hemoglobin A1c        Have Misti's numbers on her DEXA scan have fallen into the osteoporotic range.  She had a hip replacement this past year and so evaluation of radius was done and T-score is under 3.  Her mother has significant osteoporosis and has had thoracic vertebrae collapse and Misti has witnessed the pain that her mother is gone through with osteoporosis and she has worked hard to prevent this diagnosis with exercise and weight lifting and good calcium and vitamin D intake but nonetheless her scores are dropping.  She would like to start a bisphosphonate to help protect her bone density and I reviewed with her the use risks and benefits of the medication including the black box warning.  Included the importance of staying upright after taking the medication and using it once a week and taking it without interference of food or other medications so that it is well absorbed.  Will repeat bone density in about 2 years if she is doing well with this she expresses understanding                Subjective   Ese Ayala is a 64 year old, presenting for the following health issues:  Results (Discuss Dexa results)        12/5/2024     8:30 AM   Additional Questions   Roomed by Tika     Via the Health Maintenance questionnaire, the patient has reported the following services have been completed DEXA: [facility] 2024-12-02, this information has been sent to the abstraction team.  History of Present Illness       Reason for visit:  Results from bone density scan.    She eats 2-3 servings of fruits and vegetables daily.She consumes 0  sweetened beverage(s) daily.She exercises with enough effort to increase her heart rate 30 to 60 minutes per day.  She exercises with enough effort to increase her heart rate 6 days per week.   She is taking medications regularly.                     Objective    /79   Pulse 82   Temp 97.7  F (36.5  C) (Tympanic)   Resp 16   LMP  (LMP Unknown)   SpO2 97%   There is no height or weight on file to calculate BMI.  Physical Exam   GENERAL: alert and no distress  MS: no gross musculoskeletal defects noted, no edema            Signed Electronically by: Emilie Orellana NP

## 2024-12-23 ENCOUNTER — TRANSFERRED RECORDS (OUTPATIENT)
Dept: HEALTH INFORMATION MANAGEMENT | Facility: CLINIC | Age: 64
End: 2024-12-23
Payer: COMMERCIAL

## 2025-03-16 ENCOUNTER — HEALTH MAINTENANCE LETTER (OUTPATIENT)
Age: 65
End: 2025-03-16

## 2025-03-24 ENCOUNTER — TRANSFERRED RECORDS (OUTPATIENT)
Dept: HEALTH INFORMATION MANAGEMENT | Facility: CLINIC | Age: 65
End: 2025-03-24

## 2025-03-24 ENCOUNTER — ALLIED HEALTH/NURSE VISIT (OUTPATIENT)
Dept: FAMILY MEDICINE | Facility: CLINIC | Age: 65
End: 2025-03-24
Payer: COMMERCIAL

## 2025-03-24 DIAGNOSIS — Z23 ENCOUNTER FOR IMMUNIZATION: Primary | ICD-10-CM

## 2025-03-24 PROCEDURE — 91320 SARSCV2 VAC 30MCG TRS-SUC IM: CPT

## 2025-03-24 PROCEDURE — 90480 ADMN SARSCOV2 VAC 1/ONLY CMP: CPT

## 2025-03-24 PROCEDURE — 99207 PR NO CHARGE NURSE ONLY: CPT

## 2025-03-24 PROCEDURE — G0009 ADMIN PNEUMOCOCCAL VACCINE: HCPCS

## 2025-03-24 PROCEDURE — 90677 PCV20 VACCINE IM: CPT

## 2025-03-24 NOTE — PROGRESS NOTES
Prior to immunization administration, verified patients identity using patient s name and date of birth. Please see Immunization Activity for additional information.     Is the patient's temperature normal (100.5 or less)? Yes     Patient MEETS CRITERIA. PROCEED with vaccine administration.          3/24/2025   COVID   Have you had myocarditis or pericarditis (inflammation of or around the heart muscle) after getting a COVID-19 vaccine? No   Have you had a serious reaction to a COVID vaccine or something in a COVID vaccine, like polyethylene glycol (PEG) or polysorbate? No   Have you had multisystem inflammatory syndrome from COVID-19 in the past 90 days? No   Have you received a bone marrow transplant within the previous 3 months? No         Patient MEETS CRITERIA. PROCEED with vaccine administration.            3/24/2025   Pneumococcal   Have you had a serious reaction to a pneumonia, diphtheria, or MMR vaccine or to something in these vaccines? No         Patient MEETS CRITERIA. PROCEED with vaccine administration.      Patient instructed to remain in clinic for 15 minutes afterwards, and to report any adverse reactions.      Link to Ancillary Visit Immunization Standing Orders SmartSet     Screening performed by Tiffany Neil MA on 3/24/2025 at 8:40 AM.

## 2025-04-07 ENCOUNTER — MYC MEDICAL ADVICE (OUTPATIENT)
Dept: FAMILY MEDICINE | Facility: CLINIC | Age: 65
End: 2025-04-07
Payer: COMMERCIAL

## 2025-05-07 ENCOUNTER — TELEPHONE (OUTPATIENT)
Dept: FAMILY MEDICINE | Facility: CLINIC | Age: 65
End: 2025-05-07
Payer: COMMERCIAL

## 2025-05-07 DIAGNOSIS — Z47.1 AFTERCARE FOLLOWING JOINT REPLACEMENT SURGERY, UNSPECIFIED JOINT: Primary | ICD-10-CM

## 2025-05-07 RX ORDER — AMOXICILLIN 500 MG/1
CAPSULE ORAL
Qty: 4 CAPSULE | Refills: 3 | Status: SHIPPED | OUTPATIENT
Start: 2025-05-07

## 2025-05-07 NOTE — TELEPHONE ENCOUNTER
General Call    Contacts       Contact Date/Time Type Contact Phone/Fax    05/07/2025 03:39 PM CDT Phone (Incoming) Ese Muhammad (Self) [phone] (M)          Reason for Call:  Broken tooth    What are your questions or concerns:  Patient broke off her tooth and has a dentist appointment tomorrow, 5.8.25. Patient had hip and knee replacement so she needs antibiotics before she goes to dentist.     [address] [phone]     Date of last appointment with provider: 12.5.24-ovFerny Orellana    Could we send this information to you in [facility] or would you prefer to receive a phone call?:   Patient would prefer a phone call   Okay to leave a detailed message?: Yes at Cell number on file:    Telephone Information:   Mobile [phone]

## 2025-07-14 DIAGNOSIS — E03.9 HYPOTHYROIDISM, UNSPECIFIED TYPE: ICD-10-CM

## 2025-07-14 RX ORDER — LEVOTHYROXINE SODIUM 88 UG/1
88 TABLET ORAL DAILY
Qty: 90 TABLET | Refills: 0 | Status: SHIPPED | OUTPATIENT
Start: 2025-07-14

## 2025-07-25 SDOH — HEALTH STABILITY: PHYSICAL HEALTH: ON AVERAGE, HOW MANY MINUTES DO YOU ENGAGE IN EXERCISE AT THIS LEVEL?: 90 MIN

## 2025-07-25 SDOH — HEALTH STABILITY: PHYSICAL HEALTH: ON AVERAGE, HOW MANY DAYS PER WEEK DO YOU ENGAGE IN MODERATE TO STRENUOUS EXERCISE (LIKE A BRISK WALK)?: 6 DAYS

## 2025-07-25 ASSESSMENT — SOCIAL DETERMINANTS OF HEALTH (SDOH): HOW OFTEN DO YOU GET TOGETHER WITH FRIENDS OR RELATIVES?: TWICE A WEEK

## 2025-07-30 ENCOUNTER — OFFICE VISIT (OUTPATIENT)
Dept: FAMILY MEDICINE | Facility: CLINIC | Age: 65
End: 2025-07-30
Attending: NURSE PRACTITIONER
Payer: COMMERCIAL

## 2025-07-30 VITALS
OXYGEN SATURATION: 96 % | TEMPERATURE: 98.2 F | HEIGHT: 59 IN | DIASTOLIC BLOOD PRESSURE: 70 MMHG | SYSTOLIC BLOOD PRESSURE: 104 MMHG | WEIGHT: 138.6 LBS | RESPIRATION RATE: 16 BRPM | HEART RATE: 75 BPM | BODY MASS INDEX: 27.94 KG/M2

## 2025-07-30 DIAGNOSIS — E06.3 AUTOIMMUNE THYROIDITIS: Primary | ICD-10-CM

## 2025-07-30 DIAGNOSIS — R73.03 PRE-DIABETES: ICD-10-CM

## 2025-07-30 DIAGNOSIS — M54.42 ACUTE LEFT-SIDED LOW BACK PAIN WITH LEFT-SIDED SCIATICA: ICD-10-CM

## 2025-07-30 DIAGNOSIS — E03.9 HYPOTHYROIDISM, UNSPECIFIED TYPE: ICD-10-CM

## 2025-07-30 DIAGNOSIS — Z00.00 ENCOUNTER FOR MEDICARE ANNUAL WELLNESS EXAM: ICD-10-CM

## 2025-07-30 DIAGNOSIS — M81.0 OSTEOPOROSIS, UNSPECIFIED OSTEOPOROSIS TYPE, UNSPECIFIED PATHOLOGICAL FRACTURE PRESENCE: ICD-10-CM

## 2025-07-30 DIAGNOSIS — E03.8 OTHER SPECIFIED HYPOTHYROIDISM: ICD-10-CM

## 2025-07-30 DIAGNOSIS — N81.10 VAGINAL PROLAPSE: ICD-10-CM

## 2025-07-30 DIAGNOSIS — Z13.6 SCREENING FOR CARDIOVASCULAR CONDITION: ICD-10-CM

## 2025-07-30 LAB
CHOLEST SERPL-MCNC: 223 MG/DL
EST. AVERAGE GLUCOSE BLD GHB EST-MCNC: 117 MG/DL
FASTING STATUS PATIENT QL REPORTED: YES
HBA1C MFR BLD: 5.7 % (ref 0–5.6)
HDLC SERPL-MCNC: 61 MG/DL
LDLC SERPL CALC-MCNC: 129 MG/DL
NONHDLC SERPL-MCNC: 162 MG/DL
TRIGL SERPL-MCNC: 163 MG/DL
TSH SERPL DL<=0.005 MIU/L-ACNC: 1.5 UIU/ML (ref 0.3–4.2)

## 2025-07-30 PROCEDURE — 36415 COLL VENOUS BLD VENIPUNCTURE: CPT | Performed by: NURSE PRACTITIONER

## 2025-07-30 PROCEDURE — 83036 HEMOGLOBIN GLYCOSYLATED A1C: CPT | Performed by: NURSE PRACTITIONER

## 2025-07-30 PROCEDURE — 84443 ASSAY THYROID STIM HORMONE: CPT | Performed by: NURSE PRACTITIONER

## 2025-07-30 PROCEDURE — 80061 LIPID PANEL: CPT | Performed by: NURSE PRACTITIONER

## 2025-07-30 RX ORDER — ALENDRONATE SODIUM 70 MG/1
70 TABLET ORAL
Qty: 12 TABLET | Refills: 4 | Status: SHIPPED | OUTPATIENT
Start: 2025-07-30

## 2025-07-30 RX ORDER — HYDROXYZINE HYDROCHLORIDE 25 MG/1
25 TABLET, FILM COATED ORAL
Qty: 30 TABLET | Refills: 3 | Status: SHIPPED | OUTPATIENT
Start: 2025-07-30

## 2025-07-30 RX ORDER — ESTRADIOL 0.1 MG/G
CREAM VAGINAL
Qty: 42.5 G | Refills: 8 | Status: SHIPPED | OUTPATIENT
Start: 2025-07-30

## 2025-07-30 RX ORDER — LEVOTHYROXINE SODIUM 88 UG/1
88 TABLET ORAL DAILY
Qty: 90 TABLET | Refills: 0 | Status: CANCELLED | OUTPATIENT
Start: 2025-07-30

## 2025-07-30 NOTE — PROGRESS NOTES
"Preventive Care Visit  [facility]  Emilie Orellana NP, Family Medicine  Jul 30, 2025      Assessment & Plan     (E06.3) Autoimmune thyroiditis  (primary encounter diagnosis)  Comment:   Plan: TSH WITH FREE T4 REFLEX        stable    (M81.0) Osteoporosis, unspecified osteoporosis type, unspecified pathological fracture presence  Comment: starting Fosamax 12/24  Plan: alendronate (FOSAMAX) 70 MG tablet        Will repeat DEXA in 18 months    (M54.42) Acute left-sided low back pain with left-sided sciatica  Comment:   Plan: hydrOXYzine HCl (ATARAX) 25 MG tablet        Rare but occasional use for pain/anxiety/sleep    (N81.10) Vaginal prolapse  Comment:   Plan: estradiol (ESTRACE) 0.1 MG/GM vaginal cream        effective    (E03.9) Hypothyroidism, unspecified type  Comment:   Plan:     (Z00.00) Encounter for Medicare annual wellness exam  Comment:   Plan: REVIEW OF HEALTH MAINTENANCE PROTOCOL ORDERS            (Z13.6) Screening for cardiovascular condition  Comment:   Plan: Lipid panel reflex to direct LDL Fasting            (E03.8) Other specified hypothyroidism  Comment:   Plan:     (R73.03) Pre-diabetes  Comment:   Plan: Hemoglobin A1c        Has struggled with pre diabetes, counseled on options and dietary changes      BMI  Estimated body mass index is 27.99 kg/m  as calculated from the following:    Height as of this encounter: 1.499 m (4' 11\").    Weight as of this encounter: 62.9 kg (138 lb 9.6 oz).       Counseling  Appropriate preventive services were addressed with this patient via screening, questionnaire, or discussion as appropriate for fall prevention, nutrition, physical activity, Tobacco-use cessation, social engagement, weight loss and cognition.  Checklist reviewing preventive services available has been given to the patient.  Reviewed patient's diet, addressing concerns and/or questions.   Information on urinary incontinence and treatment options given to patient.   Reviewed " Please print prescan out of Media Tab and then complete and sign.   Once form is completed and signed, please fax to [phone].    Please direct any questions to [phone], Option 3.   Thanks,  Forms Completion Team.  preventive health counseling, as reflected in patient instructions       Regular exercise       Healthy diet/nutrition       Osteoporosis prevention/bone health       Colorectal Cancer Screening       Advance Care Planning        Subjective   Ese Ayala is a 65 year old, presenting for the following: here today for her Welcome to Medicare visit, she is fasting and would like to have labs drawn    She is retired now. Has  90 year old mother she also takes care of and an older dog.    Bone density--using calcium and vitamin D, ice cream in diet with calcium      Physical        7/30/2025     7:59 AM   Additional Questions   Roomed by dalila kay   Accompanied by self        Vision screening was not completed today as this is not a Welcome to Medicare Visit and patient has been covered by Medicare >1 year. Patient does not wear glasses/contacts. Last eye exam was 12/2024 ([facility] [place]).   HPI         Advance Care Planning    Health Care Directive received at today's visit.  Forwarded to 8aweek.        7/25/2025   General Health   How would you rate your overall physical health? Good   Feel stress (tense, anxious, or unable to sleep) Not at all         7/25/2025   Nutrition   Diet: Regular (no restrictions)         7/25/2025   Exercise   Days per week of moderate/strenous exercise 6 days   Average minutes spent exercising at this level 90 min         7/25/2025   Social Factors   Frequency of gathering with friends or relatives Twice a week   Worry food won't last until get money to buy more No   Food not last or not have enough money for food? No   Do you have housing? (Housing is defined as stable permanent housing and does not include staying outside in a car, in a tent, in an abandoned building, in an overnight shelter, or couch-surfing.) Yes   Are you worried about losing your housing? No   Lack of transportation? No   Unable to get utilities (heat,electricity)? No         7/25/2025   Fall  Risk   Fallen 2 or more times in the past year? No   Trouble with walking or balance? No          7/25/2025   Activities of Daily Living- Home Safety   Needs help with the following daily activites None of the above   Safety concerns in the home None of the above         7/25/2025   Dental   Dentist two times every year? Yes         7/25/2025   Hearing Screening   Hearing concerns? None of the above         7/25/2025   Driving Risk Screening   Patient/family members have concerns about driving No         7/25/2025   General Alertness/Fatigue Screening   Have you been more tired than usual lately? No         7/25/2025   Urinary Incontinence Screening   Bothered by leaking urine in past 6 months Yes         Today's PHQ-2 Score:       7/30/2025     7:57 AM   PHQ-2 ( 1999 [employer])   Q1: Little interest or pleasure in doing things 0   Q2: Feeling down, depressed or hopeless 0   PHQ-2 Score 0    Q1: Little interest or pleasure in doing things Not at all   Q2: Feeling down, depressed or hopeless Not at all   PHQ-2 Score 0       Patient-reported           7/25/2025   Substance Use   Alcohol more than 3/day or more than 7/wk No   Do you have a current opioid prescription? No   How severe/bad is pain from 1 to 10? 0/10 (No Pain)   Do you use any other substances recreationally? (!) CANNABIS PRODUCTS     Social History     Tobacco Use    Smoking status: Never     Passive exposure: Past    Smokeless tobacco: Never   Vaping Use    Vaping status: Never Used   Substance Use Topics    Alcohol use: Yes     Comment: Glass on wine on weekends with dinner.    Drug use: Never           11/28/2023   LAST FHS-7 RESULTS   1st degree relative breast or ovarian cancer Yes   Any relative bilateral breast cancer No   Any male have breast cancer No   Any ONE woman have BOTH breast AND ovarian cancer Yes   Any woman with breast cancer before 50yrs Unknown   2 or more relatives with breast AND/OR ovarian cancer Yes   2 or more relatives with  breast AND/OR bowel cancer Yes        Mammogram Screening - Mammogram every 1-2 years updated in Health Maintenance based on mutual decision making      History of abnormal Pap smear: no longer needs paps        Latest Ref Rng & Units 7/19/2022     8:22 AM   PAP / HPV   PAP  Negative for Intraepithelial Lesion or Malignancy (NILM)    HPV 16 DNA Negative Negative    HPV 18 DNA Negative Negative    Other HR HPV Negative Negative      ASCVD Risk   The 10-year ASCVD risk score (Raine MAY, et al., 2019) is: 3.8%    Values used to calculate the score:      Age: 65 years      Sex: Female      Is Non- : No      Diabetic: No      Tobacco smoker: No      Systolic Blood Pressure: 104 mmHg      Is BP treated: No      HDL Cholesterol: 56 mg/dL      Total Cholesterol: 211 mg/dL            Reviewed and updated as needed this visit by Provider     Meds    Surg Hx             Lab work is in process  Current providers sharing in care for this patient include:  Patient Care Team:  Emilie Orellana NP as PCP - General (Family Medicine)  Emilie Orellana NP as Assigned PCP    The following health maintenance items are reviewed in Epic and correct as of today:  Health Maintenance   Topic Date Due    TSH W/FREE T4 REFLEX  07/30/2025    INFLUENZA VACCINE (1) 09/01/2025    MAMMO SCREENING  11/11/2025    MEDICARE ANNUAL WELLNESS VISIT  07/30/2026    ANNUAL REVIEW OF HM ORDERS  07/30/2026    FALL RISK ASSESSMENT  07/30/2026    DEXA  12/02/2026    DIABETES SCREENING  12/05/2027    COLORECTAL CANCER SCREENING  12/12/2027    LIPID  07/30/2029    ADVANCE CARE PLANNING  07/30/2030    DTAP/TDAP/TD VACCINE (4 - Td or Tdap) 07/14/2031    HEPATITIS C SCREENING  Completed    HIV SCREENING  Completed    PHQ-2 (once per calendar year)  Completed    PNEUMOCOCCAL VACCINE 50+ YEARS  Completed    HPV VACCINE (No Doses Required) Completed    ZOSTER VACCINE  Completed    RSV VACCINE  Completed    COVID-19 VACCINE  Completed     "MENINGITIS VACCINE  Aged Out    PAP  Discontinued            Objective    Exam  /70 (BP Location: Right arm, Patient Position: Sitting)   Pulse 75   Temp 98.2  F (36.8  C)   Resp 16   Ht 1.499 m (4' 11\")   Wt 62.9 kg (138 lb 9.6 oz)   LMP  (LMP Unknown)   SpO2 96%   Breastfeeding No   BMI 27.99 kg/m     Estimated body mass index is 27.99 kg/m  as calculated from the following:    Height as of this encounter: 1.499 m (4' 11\").    Weight as of this encounter: 62.9 kg (138 lb 9.6 oz).    Physical Exam  GENERAL: alert and no distress  NECK: no adenopathy, no asymmetry, masses, or scars  RESP: lungs clear to auscultation - no rales, rhonchi or wheezes  CV: regular rate and rhythm, normal S1 S2, no S3 or S4, no murmur, click or rub, no peripheral edema  MS: no gross musculoskeletal defects noted, no edema         7/30/2025   Mini Cog   Clock Draw Score 2 Normal   3 Item Recall 3 objects recalled   Mini Cog Total Score 5         Vision Screen  LEFT EYE: 10/12.5 (20/25)  RIGHT EYE: 10/10 (20/20)  Vision Screen Results: Pass      Signed Electronically by: Emilie Orellana NP    "

## 2025-07-30 NOTE — PATIENT INSTRUCTIONS
"Patient Education   Preventive Care Advice   This is general advice we often give to help people stay healthy. Your care team may have specific advice just for you. Please talk to your care team about your own preventive care needs.  Lifestyle  Exercise at least 150 minutes each week (30 minutes a day, 5 days a week).  Do muscle strengthening activities 2 days a week. These help control your weight and prevent disease.  No smoking.  Wear sunscreen to prevent skin cancer.  Take time with family and friends.  Have your home tested for radon every 2 to 5 years. Radon is a colorless, odorless gas that can harm your lungs. To learn more, go to www.health.[facility].mn.us and search for \"Radon in Homes.\"  Keep guns unloaded and locked up in a safe place like a safe or gun vault, or, use a gun lock and hide the keys. Always lock away bullets separately. To learn more, visit [employer]iNose.mn.gov and search for \"safe gun storage.\"  Nutrition  Eat 5 or more servings of fruits and vegetables each day.  Try wheat bread, brown rice and whole grain pasta (instead of white bread, rice, and pasta).  Get enough calcium and vitamin D. Check the label on foods and aim for 100% of the RDA (recommended daily allowance).  Regular exams  Have a dental exam and cleaning every 6 months.  Older adults: Ask your care team how often to have memory testing.  See your health care team every year to talk about:  Any changes in your health.  Any medicines your care team has prescribed.  Preventive care, family planning, and ways to prevent chronic diseases.  Shots (vaccines)   HPV shots (up to age 26), if you've never had them before.  Hepatitis B shots (up to age 59), if you've never had them before.  COVID-19 shot: Get this shot when it's due.  Flu shot: Get a flu shot every year.  Tetanus shot: Get a tetanus shot every 10 years.  Pneumococcal, hepatitis A, and RSV shots: Ask your care team if you need these based on your risk.  Shingles shot (for age 50 and " up).  General health tests  Diabetes screening:  Starting at age 35, Get screened for diabetes at least every 3 years.  If you are younger than age 35, ask your care team if you should be screened for diabetes.  Cholesterol test: At age 39, start having a cholesterol test every 5 years, or more often if advised.  Bone density scan (DEXA): At age 50, ask your care team if you should have this scan for osteoporosis (brittle bones).  Hepatitis C: Get tested at least once in your life.  Abdominal aortic aneurysm screening: Talk to your doctor about having this screening if you:  Have ever smoked; and  Are biologically male; and  Are between the ages of 65 and 75.  STIs (sexually transmitted infections)  Before age 24: Ask your care team if you should be screened for STIs.  After age 24: Get screened for STIs if you're at risk. You are at risk for STIs (including HIV) if:  You are sexually active with more than one person.  You don't use condoms every time.  You or a partner was diagnosed with a sexually transmitted infection.  If you are at risk for HIV, ask about PrEP medicine to prevent HIV.  Get tested for HIV at least once in your life, whether you are at risk for HIV or not.  Cancer screening tests  Cervical cancer screening: If you have a cervix, begin getting regular cervical cancer screening tests at age 21. Most people who have regular screenings with normal results can stop after age 65. Talk about this with your provider.  Breast cancer scan (mammogram): If you've ever had breasts, begin having regular mammograms starting at age 40. This is a scan to check for breast cancer.  Colon cancer screening: It is important to start screening for colon cancer at age 45.  Have a colonoscopy test every 10 years (or more often if you're at risk) Or, ask your provider about stool tests like a FIT test every year or Cologuard test every 3 years.  To learn more about your testing options, visit:  [email or web address].  For help making a decision, visit: [email or web address].  Prostate cancer screening test: If you have a prostate and are age 55 to 69, ask your provider if you would benefit from a yearly prostate cancer screening test.  Lung cancer screening: If you are a current or former smoker age 50 to 80, ask your care team if ongoing lung cancer screenings are right for you.    For informational purposes only. Not to replace the advice of your health care provider. Copyright   2023 [facility] [employer]. All rights reserved. Clinically reviewed by the [facility] Transitions Program. [employer] 163552 - REV 6/25.  Bladder Training: Care Instructions  Your Care Instructions     Bladder training is used to treat urge incontinence and stress incontinence. Urge incontinence means that the need to urinate comes on so fast that you can't get to a toilet in time. Stress incontinence means that you leak urine because of pressure on your bladder. For example, it may happen when you laugh, cough, or lift something heavy.  Bladder training can increase how long you can wait before you have to urinate. It can also help your bladder hold more urine. And it can give you better control over the urge to urinate.  It is important to remember that bladder training takes a few weeks to a few months to make a difference. You may not see results right away, but don't give up.  Follow-up care is a key part of your treatment and safety. Be sure to make and go to all appointments, and call your doctor if you are having problems. It's also a good idea to know your test results and keep a list of the medicines you take.  How can you care for yourself at home?  Work with your doctor to come up with a bladder training program that is right for you. You may use one or more of the following methods.  Delayed urination  In the beginning, try to keep from urinating for 5 minutes after you first feel the need to go.  While you  "wait, take deep, slow breaths to relax. Kegel exercises can also help you delay the need to go to the bathroom.  After some practice, when you can easily wait 5 minutes to urinate, try to wait 10 minutes before you urinate.  Slowly increase the waiting period until you are able to control when you have to urinate.  Scheduled urination  Empty your bladder when you first wake up in the morning.  Schedule times throughout the day when you will urinate.  Start by going to the bathroom every hour, even if you don't need to go.  Slowly increase the time between trips to the bathroom.  When you have found a schedule that works well for you, keep doing it.  If you wake up during the night and have to urinate, do it. Apply your schedule to waking hours only.  Kegel exercises  These tighten and strengthen pelvic muscles, which can help you control the flow of urine. (If doing these exercises causes pain, stop doing them and talk with your doctor.) To do Kegel exercises:  Squeeze your muscles as if you were trying not to pass gas. Or squeeze your muscles as if you were stopping the flow of urine. Your belly, legs, and buttocks shouldn't move.  Hold the squeeze for 3 seconds, then relax for 5 to 10 seconds.  Start with 3 seconds, then add 1 second each week until you are able to squeeze for 10 seconds.  Repeat the exercise 10 times a session. Do 3 to 8 sessions a day.  When should you call for help?  Watch closely for changes in your health, and be sure to contact your doctor if:    Your incontinence is getting worse.     You do not get better as expected.   Where can you learn more?  Go to https://www.Sumavision.net/patiented  Enter V684 in the search box to learn more about \"Bladder Training: Care Instructions.\"  Current as of: April 30, 2024  Content Version: 14.5 2024-2025 [employer]   Care instructions adapted under license by your healthcare professional. If you have questions about a medical condition or " this instruction, always ask your healthcare professional. [employer] disclaims any warranty or liability for your use of this information.    Substance Use Disorder: Care Instructions  Overview     You can improve your life and health by stopping your use of alcohol or drugs. When you don't drink or use drugs, you may feel and sleep better. You may get along better with your family, friends, and coworkers. There are medicines and programs that can help with substance use disorder.  How can you care for yourself at home?  Here are some ways to help you stay sober and prevent relapse.  If you have been given medicine to help keep you sober or reduce your cravings, be sure to take it exactly as prescribed.  Talk to your doctor about programs that can help you stop using drugs or drinking alcohol.  Do not keep alcohol or drugs in your home.  Plan ahead. Think about what you'll say if other people ask you to drink or use drugs. Try not to spend time with people who drink or use drugs.  Use the time and money spent on drinking or drugs to do something that's important to you.  Preventing a relapse  Have a plan to deal with relapse. Learn to recognize changes in your thinking that lead you to drink or use drugs. Get help before you start to drink or use drugs again.  Try to stay away from situations, friends, or places that may lead you to drink or use drugs.  If you feel the need to drink alcohol or use drugs again, seek help right away. Call a trusted friend or family member. Some people get support from organizations such as Narcotics Anonymous or [employer] or from treatment facilities.  If you relapse, get help as soon as you can. Some people make a plan with another person that outlines what they want that person to do for them if they relapse. The plan usually includes how to handle the relapse and who to notify in case of relapse.  Don't give up. Remember that a relapse doesn't mean that you have  "failed. Use the experience to learn the triggers that lead you to drink or use drugs. Then quit again. Recovery is a lifelong process. Many people have several relapses before they are able to quit for good.  Follow-up care is a key part of your treatment and safety. Be sure to make and go to all appointments, and call your doctor if you are having problems. It's also a good idea to know your test results and keep a list of the medicines you take.  When should you call for help?   Call 911  anytime you think you may need emergency care. For example, call if you or someone else:    Has overdosed or has withdrawal signs. Be sure to tell the emergency workers that you are or someone else is using or trying to quit using drugs. Overdose or withdrawal signs may include:  Losing consciousness.  Seizure.  Seeing or hearing things that aren't there (hallucinations).     Is thinking or talking about suicide or harming others.   Where to get help 24 hours a day, 7 days a week   If you or someone you know talks about suicide, self-harm, a mental health crisis, a substance use crisis, or any other kind of emotional distress, get help right away. You can:    Call the Suicide and Crisis Lifeline at 988.     Call 4-386-019-TALK (1-871.784.5895).     Text HOME to 255431 to access the Crisis Text Line.   Consider saving these numbers in your phone.  Go to [employer] for more information or to chat online.  Call your doctor now or seek immediate medical care if:    You are having withdrawal symptoms. These may include nausea or vomiting, sweating, shakiness, and anxiety.   Watch closely for changes in your health, and be sure to contact your doctor if:    You have a relapse.     You need more help or support to stop.   Where can you learn more?  Go to https://www.healthwise.net/patiented  Enter H573 in the search box to learn more about \"Substance Use Disorder: Care Instructions.\"  Current as of: August 20, 2024  Content " "Version: 14.5    9206-0370 [employer].   Care instructions adapted under license by your healthcare professional. If you have questions about a medical condition or this instruction, always ask your healthcare professional. [employer] disclaims any warranty or liability for your use of this information.       Patient Education   Preventive Care Advice   This is general advice we often give to help people stay healthy. Your care team may have specific advice just for you. Please talk to your care team about your own preventive care needs.  Lifestyle  Exercise at least 150 minutes each week (30 minutes a day, 5 days a week).  Do muscle strengthening activities 2 days a week. These help control your weight and prevent disease.  No smoking.  Wear sunscreen to prevent skin cancer.  Take time with family and friends.  Have your home tested for radon every 2 to 5 years. Radon is a colorless, odorless gas that can harm your lungs. To learn more, go to www.health.[facility].mn.us and search for \"Radon in Homes.\"  Keep guns unloaded and locked up in a safe place like a safe or gun vault, or, use a gun lock and hide the keys. Always lock away bullets separately. To learn more, visit [employer].mn.gov and search for \"safe gun storage.\"  Nutrition  Eat 5 or more servings of fruits and vegetables each day.  Try wheat bread, brown rice and whole grain pasta (instead of white bread, rice, and pasta).  Get enough calcium and vitamin D. Check the label on foods and aim for 100% of the RDA (recommended daily allowance).  Regular exams  Have a dental exam and cleaning every 6 months.  Older adults: Ask your care team how often to have memory testing.  See your health care team every year to talk about:  Any changes in your health.  Any medicines your care team has prescribed.  Preventive care, family planning, and ways to prevent chronic diseases.  Shots (vaccines)   HPV shots (up to age 26), if you've never had them " before.  Hepatitis B shots (up to age 59), if you've never had them before.  COVID-19 shot: Get this shot when it's due.  Flu shot: Get a flu shot every year.  Tetanus shot: Get a tetanus shot every 10 years.  Pneumococcal, hepatitis A, and RSV shots: Ask your care team if you need these based on your risk.  Shingles shot (for age 50 and up).  General health tests  Diabetes screening:  Starting at age 35, Get screened for diabetes at least every 3 years.  If you are younger than age 35, ask your care team if you should be screened for diabetes.  Cholesterol test: At age 39, start having a cholesterol test every 5 years, or more often if advised.  Bone density scan (DEXA): At age 50, ask your care team if you should have this scan for osteoporosis (brittle bones).  Hepatitis C: Get tested at least once in your life.  Abdominal aortic aneurysm screening: Talk to your doctor about having this screening if you:  Have ever smoked; and  Are biologically male; and  Are between the ages of 65 and 75.  STIs (sexually transmitted infections)  Before age 24: Ask your care team if you should be screened for STIs.  After age 24: Get screened for STIs if you're at risk. You are at risk for STIs (including HIV) if:  You are sexually active with more than one person.  You don't use condoms every time.  You or a partner was diagnosed with a sexually transmitted infection.  If you are at risk for HIV, ask about PrEP medicine to prevent HIV.  Get tested for HIV at least once in your life, whether you are at risk for HIV or not.  Cancer screening tests  Cervical cancer screening: If you have a cervix, begin getting regular cervical cancer screening tests at age 21. Most people who have regular screenings with normal results can stop after age 65. Talk about this with your provider.  Breast cancer scan (mammogram): If you've ever had breasts, begin having regular mammograms starting at age 40. This is a scan to check for breast  cancer.  Colon cancer screening: It is important to start screening for colon cancer at age 45.  Have a colonoscopy test every 10 years (or more often if you're at risk) Or, ask your provider about stool tests like a FIT test every year or Cologuard test every 3 years.  To learn more about your testing options, visit: [email or web address].  For help making a decision, visit: [email or web address].  Prostate cancer screening test: If you have a prostate and are age 55 to 69, ask your provider if you would benefit from a yearly prostate cancer screening test.  Lung cancer screening: If you are a current or former smoker age 50 to 80, ask your care team if ongoing lung cancer screenings are right for you.    For informational purposes only. Not to replace the advice of your health care provider. Copyright   2023 [employer]. All rights reserved. Clinically reviewed by the [facility] Transitions Program. [employer] 083315 - REV 6/25.  Bladder Training: Care Instructions  Your Care Instructions     Bladder training is used to treat urge incontinence and stress incontinence. Urge incontinence means that the need to urinate comes on so fast that you can't get to a toilet in time. Stress incontinence means that you leak urine because of pressure on your bladder. For example, it may happen when you laugh, cough, or lift something heavy.  Bladder training can increase how long you can wait before you have to urinate. It can also help your bladder hold more urine. And it can give you better control over the urge to urinate.  It is important to remember that bladder training takes a few weeks to a few months to make a difference. You may not see results right away, but don't give up.  Follow-up care is a key part of your treatment and safety. Be sure to make and go to all appointments, and call your doctor if you are having problems. It's also a good idea to know your test results and keep a list of the  medicines you take.  How can you care for yourself at home?  Work with your doctor to come up with a bladder training program that is right for you. You may use one or more of the following methods.  Delayed urination  In the beginning, try to keep from urinating for 5 minutes after you first feel the need to go.  While you wait, take deep, slow breaths to relax. Kegel exercises can also help you delay the need to go to the bathroom.  After some practice, when you can easily wait 5 minutes to urinate, try to wait 10 minutes before you urinate.  Slowly increase the waiting period until you are able to control when you have to urinate.  Scheduled urination  Empty your bladder when you first wake up in the morning.  Schedule times throughout the day when you will urinate.  Start by going to the bathroom every hour, even if you don't need to go.  Slowly increase the time between trips to the bathroom.  When you have found a schedule that works well for you, keep doing it.  If you wake up during the night and have to urinate, do it. Apply your schedule to waking hours only.  Kegel exercises  These tighten and strengthen pelvic muscles, which can help you control the flow of urine. (If doing these exercises causes pain, stop doing them and talk with your doctor.) To do Kegel exercises:  Squeeze your muscles as if you were trying not to pass gas. Or squeeze your muscles as if you were stopping the flow of urine. Your belly, legs, and buttocks shouldn't move.  Hold the squeeze for 3 seconds, then relax for 5 to 10 seconds.  Start with 3 seconds, then add 1 second each week until you are able to squeeze for 10 seconds.  Repeat the exercise 10 times a session. Do 3 to 8 sessions a day.  When should you call for help?  Watch closely for changes in your health, and be sure to contact your doctor if:    Your incontinence is getting worse.     You do not get better as expected.   Where can you learn more?  Go to  "https://www.StartupMojo.net/patiented  Enter V684 in the search box to learn more about \"Bladder Training: Care Instructions.\"  Current as of: April 30, 2024  Content Version: 14.5 2024-2025 [employer].   Care instructions adapted under license by your healthcare professional. If you have questions about a medical condition or this instruction, always ask your healthcare professional. [employer] disclaims any warranty or liability for your use of this information.    Substance Use Disorder: Care Instructions  Overview     You can improve your life and health by stopping your use of alcohol or drugs. When you don't drink or use drugs, you may feel and sleep better. You may get along better with your family, friends, and coworkers. There are medicines and programs that can help with substance use disorder.  How can you care for yourself at home?  Here are some ways to help you stay sober and prevent relapse.  If you have been given medicine to help keep you sober or reduce your cravings, be sure to take it exactly as prescribed.  Talk to your doctor about programs that can help you stop using drugs or drinking alcohol.  Do not keep alcohol or drugs in your home.  Plan ahead. Think about what you'll say if other people ask you to drink or use drugs. Try not to spend time with people who drink or use drugs.  Use the time and money spent on drinking or drugs to do something that's important to you.  Preventing a relapse  Have a plan to deal with relapse. Learn to recognize changes in your thinking that lead you to drink or use drugs. Get help before you start to drink or use drugs again.  Try to stay away from situations, friends, or places that may lead you to drink or use drugs.  If you feel the need to drink alcohol or use drugs again, seek help right away. Call a trusted friend or family member. Some people get support from organizations such as Narcotics Anonymous or [employer] or from " treatment facilities.  If you relapse, get help as soon as you can. Some people make a plan with another person that outlines what they want that person to do for them if they relapse. The plan usually includes how to handle the relapse and who to notify in case of relapse.  Don't give up. Remember that a relapse doesn't mean that you have failed. Use the experience to learn the triggers that lead you to drink or use drugs. Then quit again. Recovery is a lifelong process. Many people have several relapses before they are able to quit for good.  Follow-up care is a key part of your treatment and safety. Be sure to make and go to all appointments, and call your doctor if you are having problems. It's also a good idea to know your test results and keep a list of the medicines you take.  When should you call for help?   Call 911  anytime you think you may need emergency care. For example, call if you or someone else:    Has overdosed or has withdrawal signs. Be sure to tell the emergency workers that you are or someone else is using or trying to quit using drugs. Overdose or withdrawal signs may include:  Losing consciousness.  Seizure.  Seeing or hearing things that aren't there (hallucinations).     Is thinking or talking about suicide or harming others.   Where to get help 24 hours a day, 7 days a week   If you or someone you know talks about suicide, self-harm, a mental health crisis, a substance use crisis, or any other kind of emotional distress, get help right away. You can:    Call the Suicide and Crisis Lifeline at 988.     Call 1-343-591-TALK (1-721.418.1593).     Text HOME to 885672 to access the Crisis Text Line.   Consider saving these numbers in your phone.  Go to [employer]ival[employer]o for more information or to chat online.  Call your doctor now or seek immediate medical care if:    You are having withdrawal symptoms. These may include nausea or vomiting, sweating, shakiness, and anxiety.   Watch closely for  "changes in your health, and be sure to contact your doctor if:    You have a relapse.     You need more help or support to stop.   Where can you learn more?  Go to https://www.Insurity.net/patiented  Enter H573 in the search box to learn more about \"Substance Use Disorder: Care Instructions.\"  Current as of: August 20, 2024  Content Version: 14.5 2024-2025 [employer].   Care instructions adapted under license by your healthcare professional. If you have questions about a medical condition or this instruction, always ask your healthcare professional. [employer] disclaims any warranty or liability [id number] [phone] this [id number]       "